# Patient Record
Sex: FEMALE | Race: WHITE | Employment: OTHER | URBAN - METROPOLITAN AREA
[De-identification: names, ages, dates, MRNs, and addresses within clinical notes are randomized per-mention and may not be internally consistent; named-entity substitution may affect disease eponyms.]

---

## 2017-01-17 ENCOUNTER — OFFICE VISIT (OUTPATIENT)
Dept: URGENT CARE | Facility: URGENT CARE | Age: 72
End: 2017-01-17
Payer: MEDICARE

## 2017-01-17 VITALS
OXYGEN SATURATION: 92 % | SYSTOLIC BLOOD PRESSURE: 124 MMHG | WEIGHT: 175 LBS | BODY MASS INDEX: 32 KG/M2 | DIASTOLIC BLOOD PRESSURE: 78 MMHG | HEART RATE: 101 BPM | TEMPERATURE: 97.4 F

## 2017-01-17 DIAGNOSIS — J06.9 UPPER RESPIRATORY TRACT INFECTION, UNSPECIFIED TYPE: ICD-10-CM

## 2017-01-17 DIAGNOSIS — J01.90 ACUTE SINUSITIS WITH SYMPTOMS > 10 DAYS: Primary | ICD-10-CM

## 2017-01-17 PROCEDURE — 99213 OFFICE O/P EST LOW 20 MIN: CPT | Performed by: PHYSICIAN ASSISTANT

## 2017-01-17 NOTE — NURSING NOTE
"Chief Complaint   Patient presents with     Urgent Care     x2.5 weeks headache and congestion        Initial /78 mmHg  Pulse 101  Temp(Src) 97.4  F (36.3  C) (Oral)  Wt 175 lb (79.379 kg)  SpO2 92% Estimated body mass index is 32 kg/(m^2) as calculated from the following:    Height as of 11/12/15: 5' 2\" (1.575 m).    Weight as of this encounter: 175 lb (79.379 kg).  BP completed using cuff size: sal Lazaro MA      "

## 2017-01-17 NOTE — PROGRESS NOTES
SUBJECTIVE:   Kaitlyn Yao is a 71 year old female presenting with a chief complaint of sinus congestion, chest congestion.  Onset of symptoms was 2 week(s) ago.  Course of illness is worsening.    Severity moderate  Current and Associated symptoms: sinus drainage, congestion  Treatment measures tried include OTC meds.  Predisposing factors include recent illness.    Past Medical History   Diagnosis Date     Hypertension      Hyperlipidemia      Diabetes mellitus (H)      type 2     Rosacea      Urinary, incontinence, stress female      Noninfectious ileitis      Meniere's disease      Hearing loss         Allergies   Allergen Reactions     Chocolate Other (See Comments)     migraines         Social History   Substance Use Topics     Smoking status: Former Smoker     Quit date: 08/17/1977     Smokeless tobacco: Never Used     Alcohol Use: No       ROS:  CONSTITUTIONAL:NEGATIVE for fever, chills, change in weight  INTEGUMENTARY/SKIN: NEGATIVE for worrisome rashes, moles or lesions  ENT/MOUTH: POSITIVE for sinus drainage, congestion  RESP:NEGATIVE for significant cough or SOB  CV: NEGATIVE for chest pain, palpitations or peripheral edema  MUSCULOSKELETAL: NEGATIVE for significant arthralgias or myalgia  NEURO: NEGATIVE for weakness, dizziness or paresthesias    OBJECTIVE  :/78 mmHg  Pulse 101  Temp(Src) 97.4  F (36.3  C) (Oral)  Wt 175 lb (79.379 kg)  SpO2 92%  GENERAL APPEARANCE: healthy, alert and no distress  HENT: TM's normal bilaterally, nasal turbinates erythematous, swollen and rhinorrhea purulent  NECK: supple, nontender, no lymphadenopathy  RESP: lungs clear to auscultation - no rales, rhonchi or wheezes  CV: regular rates and rhythm, normal S1 S2, no murmur noted  ABDOMEN:  soft, nontender, no HSM or masses and bowel sounds normal  NEURO: Normal strength and tone, sensory exam grossly normal,  normal speech and mentation  SKIN: no suspicious lesions or rashes    ASSESSMENT/PLAN      ICD-10-CM     1. Acute sinusitis with symptoms > 10 days J01.90 amoxicillin-clavulanate (AUGMENTIN) 875-125 MG per tablet   2. Upper respiratory tract infection, unspecified type J06.9      Follow up as needed

## 2017-11-30 ENCOUNTER — APPOINTMENT (OUTPATIENT)
Dept: CT IMAGING | Facility: CLINIC | Age: 72
DRG: 872 | End: 2017-11-30
Attending: EMERGENCY MEDICINE
Payer: MEDICARE

## 2017-11-30 ENCOUNTER — ANESTHESIA (OUTPATIENT)
Dept: SURGERY | Facility: CLINIC | Age: 72
DRG: 872 | End: 2017-11-30
Payer: MEDICARE

## 2017-11-30 ENCOUNTER — ANESTHESIA EVENT (OUTPATIENT)
Dept: SURGERY | Facility: CLINIC | Age: 72
DRG: 872 | End: 2017-11-30
Payer: MEDICARE

## 2017-11-30 ENCOUNTER — HOSPITAL ENCOUNTER (INPATIENT)
Facility: CLINIC | Age: 72
LOS: 2 days | Discharge: HOME | DRG: 872 | End: 2017-12-02
Attending: EMERGENCY MEDICINE | Admitting: INTERNAL MEDICINE
Payer: MEDICARE

## 2017-11-30 ENCOUNTER — APPOINTMENT (OUTPATIENT)
Dept: GENERAL RADIOLOGY | Facility: CLINIC | Age: 72
DRG: 872 | End: 2017-11-30
Attending: INTERNAL MEDICINE
Payer: MEDICARE

## 2017-11-30 DIAGNOSIS — N10 ACUTE PYELONEPHRITIS: ICD-10-CM

## 2017-11-30 DIAGNOSIS — B37.31 YEAST INFECTION OF THE VAGINA: ICD-10-CM

## 2017-11-30 DIAGNOSIS — N20.1 CALCULUS OF URETER: ICD-10-CM

## 2017-11-30 DIAGNOSIS — Z78.9 TAKES DIETARY SUPPLEMENTS: Primary | ICD-10-CM

## 2017-11-30 LAB
ALBUMIN UR-MCNC: 30 MG/DL
ANION GAP SERPL CALCULATED.3IONS-SCNC: 6 MMOL/L (ref 3–14)
APPEARANCE UR: ABNORMAL
BASOPHILS # BLD AUTO: 0 10E9/L (ref 0–0.2)
BASOPHILS NFR BLD AUTO: 0.2 %
BILIRUB UR QL STRIP: NEGATIVE
BUN SERPL-MCNC: 23 MG/DL (ref 7–30)
CALCIUM SERPL-MCNC: 9 MG/DL (ref 8.5–10.1)
CHLORIDE SERPL-SCNC: 105 MMOL/L (ref 94–109)
CO2 SERPL-SCNC: 27 MMOL/L (ref 20–32)
COLOR UR AUTO: YELLOW
CREAT SERPL-MCNC: 0.71 MG/DL (ref 0.52–1.04)
DIFFERENTIAL METHOD BLD: ABNORMAL
EOSINOPHIL # BLD AUTO: 0 10E9/L (ref 0–0.7)
EOSINOPHIL NFR BLD AUTO: 0.2 %
ERYTHROCYTE [DISTWIDTH] IN BLOOD BY AUTOMATED COUNT: 13.9 % (ref 10–15)
GFR SERPL CREATININE-BSD FRML MDRD: 81 ML/MIN/1.7M2
GLUCOSE BLDC GLUCOMTR-MCNC: 178 MG/DL (ref 70–99)
GLUCOSE BLDC GLUCOMTR-MCNC: 199 MG/DL (ref 70–99)
GLUCOSE BLDC GLUCOMTR-MCNC: 208 MG/DL (ref 70–99)
GLUCOSE BLDC GLUCOMTR-MCNC: 221 MG/DL (ref 70–99)
GLUCOSE BLDC GLUCOMTR-MCNC: 245 MG/DL (ref 70–99)
GLUCOSE SERPL-MCNC: 248 MG/DL (ref 70–99)
GLUCOSE UR STRIP-MCNC: 300 MG/DL
HCT VFR BLD AUTO: 42.3 % (ref 35–47)
HGB BLD-MCNC: 14.5 G/DL (ref 11.7–15.7)
HGB UR QL STRIP: ABNORMAL
IMM GRANULOCYTES # BLD: 0 10E9/L (ref 0–0.4)
IMM GRANULOCYTES NFR BLD: 0.2 %
KETONES UR STRIP-MCNC: 10 MG/DL
LEUKOCYTE ESTERASE UR QL STRIP: ABNORMAL
LYMPHOCYTES # BLD AUTO: 1.3 10E9/L (ref 0.8–5.3)
LYMPHOCYTES NFR BLD AUTO: 10.5 %
MCH RBC QN AUTO: 31.5 PG (ref 26.5–33)
MCHC RBC AUTO-ENTMCNC: 34.3 G/DL (ref 31.5–36.5)
MCV RBC AUTO: 92 FL (ref 78–100)
MONOCYTES # BLD AUTO: 0.3 10E9/L (ref 0–1.3)
MONOCYTES NFR BLD AUTO: 2.5 %
MUCOUS THREADS #/AREA URNS LPF: PRESENT /LPF
NEUTROPHILS # BLD AUTO: 11 10E9/L (ref 1.6–8.3)
NEUTROPHILS NFR BLD AUTO: 86.4 %
NITRATE UR QL: POSITIVE
NRBC # BLD AUTO: 0 10*3/UL
NRBC BLD AUTO-RTO: 0 /100
PH UR STRIP: 5.5 PH (ref 5–7)
PLATELET # BLD AUTO: 208 10E9/L (ref 150–450)
POTASSIUM SERPL-SCNC: 4.8 MMOL/L (ref 3.4–5.3)
RBC # BLD AUTO: 4.6 10E12/L (ref 3.8–5.2)
RBC #/AREA URNS AUTO: 26 /HPF (ref 0–2)
SODIUM SERPL-SCNC: 138 MMOL/L (ref 133–144)
SOURCE: ABNORMAL
SP GR UR STRIP: 1.02 (ref 1–1.03)
SQUAMOUS #/AREA URNS AUTO: 1 /HPF (ref 0–1)
UROBILINOGEN UR STRIP-MCNC: NORMAL MG/DL (ref 0–2)
WBC # BLD AUTO: 12.8 10E9/L (ref 4–11)
WBC #/AREA URNS AUTO: >182 /HPF (ref 0–2)
WBC CLUMPS #/AREA URNS HPF: PRESENT /HPF

## 2017-11-30 PROCEDURE — 27210794 ZZH OR GENERAL SUPPLY STERILE: Performed by: UROLOGY

## 2017-11-30 PROCEDURE — C1758 CATHETER, URETERAL: HCPCS | Performed by: UROLOGY

## 2017-11-30 PROCEDURE — 99285 EMERGENCY DEPT VISIT HI MDM: CPT | Mod: 25

## 2017-11-30 PROCEDURE — 00000146 ZZHCL STATISTIC GLUCOSE BY METER IP

## 2017-11-30 PROCEDURE — 87186 SC STD MICRODIL/AGAR DIL: CPT | Performed by: EMERGENCY MEDICINE

## 2017-11-30 PROCEDURE — 87086 URINE CULTURE/COLONY COUNT: CPT | Performed by: EMERGENCY MEDICINE

## 2017-11-30 PROCEDURE — 96375 TX/PRO/DX INJ NEW DRUG ADDON: CPT

## 2017-11-30 PROCEDURE — 25000128 H RX IP 250 OP 636: Performed by: ANESTHESIOLOGY

## 2017-11-30 PROCEDURE — 25000128 H RX IP 250 OP 636: Performed by: EMERGENCY MEDICINE

## 2017-11-30 PROCEDURE — 25000128 H RX IP 250 OP 636: Performed by: NURSE ANESTHETIST, CERTIFIED REGISTERED

## 2017-11-30 PROCEDURE — 96365 THER/PROPH/DIAG IV INF INIT: CPT

## 2017-11-30 PROCEDURE — 12000000 ZZH R&B MED SURG/OB

## 2017-11-30 PROCEDURE — 71000012 ZZH RECOVERY PHASE 1 LEVEL 1 FIRST HR: Performed by: UROLOGY

## 2017-11-30 PROCEDURE — 74176 CT ABD & PELVIS W/O CONTRAST: CPT

## 2017-11-30 PROCEDURE — 80048 BASIC METABOLIC PNL TOTAL CA: CPT | Performed by: EMERGENCY MEDICINE

## 2017-11-30 PROCEDURE — 36000050 ZZH SURGERY LEVEL 2 1ST 30 MIN: Performed by: UROLOGY

## 2017-11-30 PROCEDURE — 37000009 ZZH ANESTHESIA TECHNICAL FEE, EACH ADDTL 15 MIN: Performed by: UROLOGY

## 2017-11-30 PROCEDURE — 93005 ELECTROCARDIOGRAM TRACING: CPT

## 2017-11-30 PROCEDURE — 37000008 ZZH ANESTHESIA TECHNICAL FEE, 1ST 30 MIN: Performed by: UROLOGY

## 2017-11-30 PROCEDURE — 27210995 ZZH RX 272: Performed by: UROLOGY

## 2017-11-30 PROCEDURE — 96361 HYDRATE IV INFUSION ADD-ON: CPT

## 2017-11-30 PROCEDURE — 25800025 ZZH RX 258: Performed by: UROLOGY

## 2017-11-30 PROCEDURE — 25000566 ZZH SEVOFLURANE, EA 15 MIN: Performed by: UROLOGY

## 2017-11-30 PROCEDURE — 40000170 ZZH STATISTIC PRE-PROCEDURE ASSESSMENT II: Performed by: UROLOGY

## 2017-11-30 PROCEDURE — 99207 ZZC CDG-MDM COMPONENT: MEETS LOW - DOWN CODED: CPT | Performed by: INTERNAL MEDICINE

## 2017-11-30 PROCEDURE — 25000132 ZZH RX MED GY IP 250 OP 250 PS 637: Mod: GY | Performed by: INTERNAL MEDICINE

## 2017-11-30 PROCEDURE — 0T9780Z DRAINAGE OF LEFT URETER WITH DRAINAGE DEVICE, VIA NATURAL OR ARTIFICIAL OPENING ENDOSCOPIC: ICD-10-PCS | Performed by: UROLOGY

## 2017-11-30 PROCEDURE — 85025 COMPLETE CBC W/AUTO DIFF WBC: CPT | Performed by: EMERGENCY MEDICINE

## 2017-11-30 PROCEDURE — 81001 URINALYSIS AUTO W/SCOPE: CPT | Performed by: EMERGENCY MEDICINE

## 2017-11-30 PROCEDURE — 25000131 ZZH RX MED GY IP 250 OP 636 PS 637: Mod: GY | Performed by: INTERNAL MEDICINE

## 2017-11-30 PROCEDURE — 40000277 XR SURGERY CARM FLUORO LESS THAN 5 MIN W STILLS

## 2017-11-30 PROCEDURE — A9270 NON-COVERED ITEM OR SERVICE: HCPCS | Mod: GY | Performed by: INTERNAL MEDICINE

## 2017-11-30 PROCEDURE — 25000125 ZZHC RX 250: Performed by: NURSE ANESTHETIST, CERTIFIED REGISTERED

## 2017-11-30 PROCEDURE — 99222 1ST HOSP IP/OBS MODERATE 55: CPT | Mod: AI | Performed by: INTERNAL MEDICINE

## 2017-11-30 PROCEDURE — C1769 GUIDE WIRE: HCPCS | Performed by: UROLOGY

## 2017-11-30 PROCEDURE — 25000128 H RX IP 250 OP 636: Performed by: INTERNAL MEDICINE

## 2017-11-30 PROCEDURE — 87088 URINE BACTERIA CULTURE: CPT | Performed by: EMERGENCY MEDICINE

## 2017-11-30 PROCEDURE — 25000128 H RX IP 250 OP 636: Performed by: UROLOGY

## 2017-11-30 PROCEDURE — 36000052 ZZH SURGERY LEVEL 2 EA 15 ADDTL MIN: Performed by: UROLOGY

## 2017-11-30 PROCEDURE — C2617 STENT, NON-COR, TEM W/O DEL: HCPCS | Performed by: UROLOGY

## 2017-11-30 PROCEDURE — 93010 ELECTROCARDIOGRAM REPORT: CPT | Performed by: INTERNAL MEDICINE

## 2017-11-30 DEVICE — STENT URETERAL DBL PIGTAIL INLAY 6FRX24CM 778624
Type: IMPLANTABLE DEVICE | Site: URETER | Status: NON-FUNCTIONAL
Removed: 2017-12-22

## 2017-11-30 RX ORDER — ANTIARTHRITIC COMBINATION NO.2 900 MG
5000 TABLET ORAL DAILY
COMMUNITY
End: 2017-12-19

## 2017-11-30 RX ORDER — ACETAMINOPHEN 650 MG/1
650 SUPPOSITORY RECTAL EVERY 4 HOURS PRN
Status: DISCONTINUED | OUTPATIENT
Start: 2017-11-30 | End: 2017-12-02 | Stop reason: HOSPADM

## 2017-11-30 RX ORDER — ONDANSETRON 4 MG/1
4 TABLET, ORALLY DISINTEGRATING ORAL EVERY 6 HOURS PRN
Status: DISCONTINUED | OUTPATIENT
Start: 2017-11-30 | End: 2017-12-02 | Stop reason: HOSPADM

## 2017-11-30 RX ORDER — FENTANYL CITRATE 50 UG/ML
INJECTION, SOLUTION INTRAMUSCULAR; INTRAVENOUS PRN
Status: DISCONTINUED | OUTPATIENT
Start: 2017-11-30 | End: 2017-11-30

## 2017-11-30 RX ORDER — PROCHLORPERAZINE MALEATE 5 MG
5 TABLET ORAL EVERY 6 HOURS PRN
Status: DISCONTINUED | OUTPATIENT
Start: 2017-11-30 | End: 2017-12-02 | Stop reason: HOSPADM

## 2017-11-30 RX ORDER — DEXTROSE MONOHYDRATE 25 G/50ML
25-50 INJECTION, SOLUTION INTRAVENOUS
Status: DISCONTINUED | OUTPATIENT
Start: 2017-11-30 | End: 2017-12-02 | Stop reason: HOSPADM

## 2017-11-30 RX ORDER — PROCHLORPERAZINE 25 MG
12.5 SUPPOSITORY, RECTAL RECTAL EVERY 12 HOURS PRN
Status: DISCONTINUED | OUTPATIENT
Start: 2017-11-30 | End: 2017-12-02 | Stop reason: HOSPADM

## 2017-11-30 RX ORDER — FENTANYL CITRATE 50 UG/ML
25-50 INJECTION, SOLUTION INTRAMUSCULAR; INTRAVENOUS
Status: DISCONTINUED | OUTPATIENT
Start: 2017-11-30 | End: 2017-11-30 | Stop reason: HOSPADM

## 2017-11-30 RX ORDER — LACTOBACILLUS RHAMNOSUS GG 10B CELL
1 CAPSULE ORAL DAILY
Status: DISCONTINUED | OUTPATIENT
Start: 2017-11-30 | End: 2017-12-02 | Stop reason: HOSPADM

## 2017-11-30 RX ORDER — PROPOFOL 10 MG/ML
INJECTION, EMULSION INTRAVENOUS PRN
Status: DISCONTINUED | OUTPATIENT
Start: 2017-11-30 | End: 2017-11-30

## 2017-11-30 RX ORDER — NICOTINE POLACRILEX 4 MG
15-30 LOZENGE BUCCAL
Status: DISCONTINUED | OUTPATIENT
Start: 2017-11-30 | End: 2017-12-02 | Stop reason: HOSPADM

## 2017-11-30 RX ORDER — FENTANYL CITRATE 50 UG/ML
25-50 INJECTION, SOLUTION INTRAMUSCULAR; INTRAVENOUS
Status: DISCONTINUED | OUTPATIENT
Start: 2017-11-30 | End: 2017-11-30

## 2017-11-30 RX ORDER — ONDANSETRON 2 MG/ML
4 INJECTION INTRAMUSCULAR; INTRAVENOUS EVERY 30 MIN PRN
Status: DISCONTINUED | OUTPATIENT
Start: 2017-11-30 | End: 2017-11-30 | Stop reason: HOSPADM

## 2017-11-30 RX ORDER — ONDANSETRON 4 MG/1
4 TABLET, ORALLY DISINTEGRATING ORAL EVERY 30 MIN PRN
Status: DISCONTINUED | OUTPATIENT
Start: 2017-11-30 | End: 2017-11-30 | Stop reason: HOSPADM

## 2017-11-30 RX ORDER — KETOROLAC TROMETHAMINE 15 MG/ML
15 INJECTION, SOLUTION INTRAMUSCULAR; INTRAVENOUS ONCE
Status: COMPLETED | OUTPATIENT
Start: 2017-11-30 | End: 2017-11-30

## 2017-11-30 RX ORDER — ONDANSETRON 2 MG/ML
4 INJECTION INTRAMUSCULAR; INTRAVENOUS EVERY 6 HOURS PRN
Status: DISCONTINUED | OUTPATIENT
Start: 2017-11-30 | End: 2017-12-02 | Stop reason: HOSPADM

## 2017-11-30 RX ORDER — NALOXONE HYDROCHLORIDE 0.4 MG/ML
.1-.4 INJECTION, SOLUTION INTRAMUSCULAR; INTRAVENOUS; SUBCUTANEOUS
Status: DISCONTINUED | OUTPATIENT
Start: 2017-11-30 | End: 2017-12-02 | Stop reason: HOSPADM

## 2017-11-30 RX ORDER — CEFAZOLIN SODIUM 2 G/100ML
2 INJECTION, SOLUTION INTRAVENOUS
Status: DISCONTINUED | OUTPATIENT
Start: 2017-11-30 | End: 2017-11-30

## 2017-11-30 RX ORDER — POLYETHYLENE GLYCOL 3350 17 G/17G
17 POWDER, FOR SOLUTION ORAL DAILY PRN
Status: DISCONTINUED | OUTPATIENT
Start: 2017-11-30 | End: 2017-12-02 | Stop reason: HOSPADM

## 2017-11-30 RX ORDER — SODIUM CHLORIDE, SODIUM LACTATE, POTASSIUM CHLORIDE, CALCIUM CHLORIDE 600; 310; 30; 20 MG/100ML; MG/100ML; MG/100ML; MG/100ML
INJECTION, SOLUTION INTRAVENOUS CONTINUOUS
Status: DISCONTINUED | OUTPATIENT
Start: 2017-11-30 | End: 2017-11-30

## 2017-11-30 RX ORDER — MORPHINE SULFATE 2 MG/ML
2-4 INJECTION, SOLUTION INTRAMUSCULAR; INTRAVENOUS
Status: DISCONTINUED | OUTPATIENT
Start: 2017-11-30 | End: 2017-12-02 | Stop reason: HOSPADM

## 2017-11-30 RX ORDER — SODIUM CHLORIDE, SODIUM LACTATE, POTASSIUM CHLORIDE, CALCIUM CHLORIDE 600; 310; 30; 20 MG/100ML; MG/100ML; MG/100ML; MG/100ML
INJECTION, SOLUTION INTRAVENOUS CONTINUOUS PRN
Status: DISCONTINUED | OUTPATIENT
Start: 2017-11-30 | End: 2017-11-30

## 2017-11-30 RX ORDER — SODIUM CHLORIDE 9 MG/ML
INJECTION, SOLUTION INTRAVENOUS CONTINUOUS
Status: DISCONTINUED | OUTPATIENT
Start: 2017-11-30 | End: 2017-12-01

## 2017-11-30 RX ORDER — OXYCODONE AND ACETAMINOPHEN 5; 325 MG/1; MG/1
1-2 TABLET ORAL EVERY 6 HOURS PRN
Status: DISCONTINUED | OUTPATIENT
Start: 2017-11-30 | End: 2017-12-02 | Stop reason: HOSPADM

## 2017-11-30 RX ORDER — MEPERIDINE HYDROCHLORIDE 25 MG/ML
12.5 INJECTION INTRAMUSCULAR; INTRAVENOUS; SUBCUTANEOUS EVERY 5 MIN PRN
Status: DISCONTINUED | OUTPATIENT
Start: 2017-11-30 | End: 2017-11-30 | Stop reason: HOSPADM

## 2017-11-30 RX ORDER — ONDANSETRON 2 MG/ML
INJECTION INTRAMUSCULAR; INTRAVENOUS PRN
Status: DISCONTINUED | OUTPATIENT
Start: 2017-11-30 | End: 2017-11-30

## 2017-11-30 RX ORDER — MORPHINE SULFATE 4 MG/ML
4 INJECTION, SOLUTION INTRAMUSCULAR; INTRAVENOUS ONCE
Status: COMPLETED | OUTPATIENT
Start: 2017-11-30 | End: 2017-11-30

## 2017-11-30 RX ORDER — TAMSULOSIN HYDROCHLORIDE 0.4 MG/1
0.4 CAPSULE ORAL DAILY
Status: DISCONTINUED | OUTPATIENT
Start: 2017-11-30 | End: 2017-12-02 | Stop reason: HOSPADM

## 2017-11-30 RX ORDER — ACETAMINOPHEN 325 MG/1
650 TABLET ORAL EVERY 4 HOURS PRN
Status: DISCONTINUED | OUTPATIENT
Start: 2017-11-30 | End: 2017-12-02 | Stop reason: HOSPADM

## 2017-11-30 RX ORDER — LIDOCAINE HYDROCHLORIDE 20 MG/ML
INJECTION, SOLUTION INFILTRATION; PERINEURAL PRN
Status: DISCONTINUED | OUTPATIENT
Start: 2017-11-30 | End: 2017-11-30

## 2017-11-30 RX ORDER — PRAVASTATIN SODIUM 20 MG
20 TABLET ORAL DAILY
Status: DISCONTINUED | OUTPATIENT
Start: 2017-11-30 | End: 2017-12-02 | Stop reason: HOSPADM

## 2017-11-30 RX ORDER — HYDROMORPHONE HYDROCHLORIDE 1 MG/ML
.3-.5 INJECTION, SOLUTION INTRAMUSCULAR; INTRAVENOUS; SUBCUTANEOUS EVERY 5 MIN PRN
Status: DISCONTINUED | OUTPATIENT
Start: 2017-11-30 | End: 2017-11-30 | Stop reason: HOSPADM

## 2017-11-30 RX ORDER — DEXAMETHASONE SODIUM PHOSPHATE 4 MG/ML
INJECTION, SOLUTION INTRA-ARTICULAR; INTRALESIONAL; INTRAMUSCULAR; INTRAVENOUS; SOFT TISSUE PRN
Status: DISCONTINUED | OUTPATIENT
Start: 2017-11-30 | End: 2017-11-30

## 2017-11-30 RX ORDER — PROPOFOL 10 MG/ML
INJECTION, EMULSION INTRAVENOUS CONTINUOUS PRN
Status: DISCONTINUED | OUTPATIENT
Start: 2017-11-30 | End: 2017-11-30

## 2017-11-30 RX ORDER — SODIUM CHLORIDE, SODIUM LACTATE, POTASSIUM CHLORIDE, CALCIUM CHLORIDE 600; 310; 30; 20 MG/100ML; MG/100ML; MG/100ML; MG/100ML
INJECTION, SOLUTION INTRAVENOUS CONTINUOUS
Status: DISCONTINUED | OUTPATIENT
Start: 2017-11-30 | End: 2017-12-01

## 2017-11-30 RX ORDER — ONDANSETRON 2 MG/ML
4 INJECTION INTRAMUSCULAR; INTRAVENOUS
Status: DISCONTINUED | OUTPATIENT
Start: 2017-11-30 | End: 2017-11-30

## 2017-11-30 RX ADMIN — MIDAZOLAM HYDROCHLORIDE 2 MG: 1 INJECTION, SOLUTION INTRAMUSCULAR; INTRAVENOUS at 14:53

## 2017-11-30 RX ADMIN — SODIUM CHLORIDE: 9 INJECTION, SOLUTION INTRAVENOUS at 09:17

## 2017-11-30 RX ADMIN — SODIUM CHLORIDE, POTASSIUM CHLORIDE, SODIUM LACTATE AND CALCIUM CHLORIDE: 600; 310; 30; 20 INJECTION, SOLUTION INTRAVENOUS at 14:34

## 2017-11-30 RX ADMIN — LIDOCAINE HYDROCHLORIDE 40 MG: 20 INJECTION, SOLUTION INFILTRATION; PERINEURAL at 14:56

## 2017-11-30 RX ADMIN — ONDANSETRON 4 MG: 2 INJECTION INTRAMUSCULAR; INTRAVENOUS at 06:22

## 2017-11-30 RX ADMIN — CEFTRIAXONE 1 G: 1 INJECTION, SOLUTION INTRAVENOUS at 06:42

## 2017-11-30 RX ADMIN — FENTANYL CITRATE 50 MCG: 50 INJECTION, SOLUTION INTRAMUSCULAR; INTRAVENOUS at 14:56

## 2017-11-30 RX ADMIN — PROPOFOL 50 MCG/KG/MIN: 10 INJECTION, EMULSION INTRAVENOUS at 14:56

## 2017-11-30 RX ADMIN — DEXAMETHASONE SODIUM PHOSPHATE 4 MG: 4 INJECTION, SOLUTION INTRA-ARTICULAR; INTRALESIONAL; INTRAMUSCULAR; INTRAVENOUS; SOFT TISSUE at 15:12

## 2017-11-30 RX ADMIN — PROPOFOL 200 MG: 10 INJECTION, EMULSION INTRAVENOUS at 14:56

## 2017-11-30 RX ADMIN — Medication 1 CAPSULE: at 09:18

## 2017-11-30 RX ADMIN — KETOROLAC TROMETHAMINE 15 MG: 15 INJECTION, SOLUTION INTRAMUSCULAR; INTRAVENOUS at 06:17

## 2017-11-30 RX ADMIN — ONDANSETRON 4 MG: 2 INJECTION INTRAMUSCULAR; INTRAVENOUS at 15:16

## 2017-11-30 RX ADMIN — FENTANYL CITRATE 50 MCG: 50 INJECTION, SOLUTION INTRAMUSCULAR; INTRAVENOUS at 15:07

## 2017-11-30 RX ADMIN — TAMSULOSIN HYDROCHLORIDE 0.4 MG: 0.4 CAPSULE ORAL at 09:18

## 2017-11-30 RX ADMIN — SODIUM CHLORIDE, POTASSIUM CHLORIDE, SODIUM LACTATE AND CALCIUM CHLORIDE: 600; 310; 30; 20 INJECTION, SOLUTION INTRAVENOUS at 14:51

## 2017-11-30 RX ADMIN — MORPHINE SULFATE 4 MG: 4 INJECTION, SOLUTION INTRAMUSCULAR; INTRAVENOUS at 06:33

## 2017-11-30 RX ADMIN — INSULIN ASPART 1 UNITS: 100 INJECTION, SOLUTION INTRAVENOUS; SUBCUTANEOUS at 17:47

## 2017-11-30 RX ADMIN — SODIUM CHLORIDE 1000 ML: 9 INJECTION, SOLUTION INTRAVENOUS at 06:19

## 2017-11-30 RX ADMIN — ACETAMINOPHEN 650 MG: 325 TABLET, FILM COATED ORAL at 19:25

## 2017-11-30 ASSESSMENT — ENCOUNTER SYMPTOMS
FLANK PAIN: 1
FEVER: 1
CHILLS: 1
NAUSEA: 1
ABDOMINAL PAIN: 1
VOMITING: 0
DYSURIA: 0
HEMATURIA: 0
DIFFICULTY URINATING: 0

## 2017-11-30 ASSESSMENT — ACTIVITIES OF DAILY LIVING (ADL)
BATHING: 0-->INDEPENDENT
RETIRED_COMMUNICATION: 0-->UNDERSTANDS/COMMUNICATES WITHOUT DIFFICULTY
TOILETING: 0-->INDEPENDENT
FALL_HISTORY_WITHIN_LAST_SIX_MONTHS: NO
AMBULATION: 0-->INDEPENDENT
TRANSFERRING: 0-->INDEPENDENT
RETIRED_EATING: 0-->INDEPENDENT
DRESS: 0-->INDEPENDENT
SWALLOWING: 0-->SWALLOWS FOODS/LIQUIDS WITHOUT DIFFICULTY
COGNITION: 0 - NO COGNITION ISSUES REPORTED

## 2017-11-30 NOTE — ANESTHESIA PREPROCEDURE EVALUATION
Anesthesia Evaluation     . Pt has had prior anesthetic.     No history of anesthetic complications          ROS/MED HX    ENT/Pulmonary:      (-) sleep apnea   Neurologic:       Cardiovascular: Comment: HTN here - no meds    (+) hypertension----. : . . . :. .       METS/Exercise Tolerance:  >4 METS   Hematologic:         Musculoskeletal:         GI/Hepatic:        (-) GERD   Renal/Genitourinary:         Endo:      (-) Type I DM   Psychiatric:         Infectious Disease:         Malignancy:         Other:                     Physical Exam  Normal systems: pulmonary and dental    Airway   Mallampati: III  TM distance: >3 FB  Neck ROM: full    Dental     Cardiovascular   Rhythm and rate: regular and normal      Pulmonary                     Anesthesia Plan      History & Physical Review  History and physical reviewed and following examination; no interval change.    ASA Status:  2 .    NPO Status:  > 8 hours    Plan for General, LMA and Periph. Nerve Block for postop pain   PONV prophylaxis:  Ondansetron (or other 5HT-3) and Dexamethasone or Solumedrol  toradol if ok with surgeon      Postoperative Care      Consents  Anesthetic plan, risks, benefits and alternatives discussed with:  Patient..                          .

## 2017-11-30 NOTE — PROGRESS NOTES
RECEIVING UNIT ED HANDOFF REVIEW    ED Nurse Handoff Report was reviewed by: Vickie Engle on November 30, 2017 at 8:34 AM

## 2017-11-30 NOTE — PHARMACY-ADMISSION MEDICATION HISTORY
Admission medication history interview status for the 11/30/2017  admission is complete. See EPIC admission navigator for prior to admission medications     Medication history source reliability:Good    Actions taken by pharmacist (provider contacted, etc): interviewed patient, she had list with her     Additional medication history information not noted on PTA med list :None    Medication reconciliation/reorder completed by provider prior to medication history? No    Time spent in this activity: 15 min    Prior to Admission medications    Medication Sig Last Dose Taking? Auth Provider   ASPIRIN PO Take 81 mg by mouth daily 11/29/2017 at Unknown time Yes Unknown, Entered By History   Cholecalciferol (VITAMIN D-3 PO) Take 1,000 Units by mouth daily 11/29/2017 at Unknown time Yes Unknown, Entered By History   Zinc 50 MG CAPS Take 50 mg by mouth daily 11/29/2017 at Unknown time Yes Unknown, Entered By History   MAGNESIUM OXIDE PO Take 200 mg by mouth daily 11/29/2017 at Unknown time Yes Unknown, Entered By History   Lactobacillus (PROBIOTIC ACIDOPHILUS PO) Take 1 capsule by mouth daily 11/29/2017 at Unknown time Yes Unknown, Entered By History   Biotin 5000 MCG TABS Take 5,000 Units by mouth daily 11/29/2017 at Unknown time Yes Unknown, Entered By History   Coenzyme Q10 (COQ-10 PO) Take 200 Units by mouth daily 11/29/2017 at Unknown time Yes Unknown, Entered By History   exenatide (BYDUREON) 2 MG SUSR Inject 2 mg Subcutaneous once a week 11/29/2017 at Unknown time Yes Reported, Patient   irbesartan (AVAPRO) 300 MG tablet Take 300 mg by mouth daily  11/30/2017 at am Yes Reported, Patient   metroNIDAZOLE (METROLOTION) 0.75 % LOTN Externally apply topically 2 times daily as needed  prn Yes Reported, Patient   Pravastatin Sodium (PRAVACHOL PO) Take 20 mg by mouth daily. 11/29/2017 at Unknown time Yes Reported, Patient

## 2017-11-30 NOTE — ANESTHESIA POSTPROCEDURE EVALUATION
Patient: Kaitlyn Yao    Procedure(s):  CYSTOSCOPY, LEFT URETERAL STENT PLACEMENT - Wound Class: II-Clean Contaminated    Diagnosis:CYSTO, LEFT STENT PLACEMENT  SEPTIC  Diagnosis Additional Information: No value filed.    Anesthesia Type:  General, LMA, Periph. Nerve Block for postop pain    Note:  Anesthesia Post Evaluation    Patient location during evaluation: PACU  Patient participation: Able to fully participate in evaluation  Level of consciousness: awake and alert  Pain management: adequate  Airway patency: patent  Cardiovascular status: acceptable  Respiratory status: acceptable  Hydration status: acceptable  PONV: none and controlled     Anesthetic complications: None          Last vitals:  Vitals:    11/30/17 1615 11/30/17 1630 11/30/17 1659   BP: 128/62 135/68 138/61   Pulse:      Resp: 26 (!) 35 22   Temp: 38.2  C (100.8  F) 38.2  C (100.8  F) 37.3  C (99.2  F)   SpO2: 92%           Electronically Signed By: Babatunde Rodriguez MD  November 30, 2017  5:04 PM

## 2017-11-30 NOTE — ED PROVIDER NOTES
"  History     Chief Complaint:  Flank pain    HPI   Kaitlyn Yao is a 72 year old female with a history of kidney stones who presents to the emergency department today for evaluation of flank pain. The patient reports yesterday she developed \"deep achy\" left flank pain with associated nausea, chills and \"slight\" fever of 100. The pain radiates to her left lower abdomen. Due to persistent symptoms this morning, she presents to the ED for further evaluation. Upon presentation, she rates her pain 8/10 and is afebrile at 97.9. She denies urinary symptoms, leg swelling, and vomiting. Of note, last kidney stone in the mid 1980's.     Allergies:  Chocolate     Medications:    aspirin (ECOTRIN) 81 mg enteric coated tablet   exenatide microspheres (BYDUREON) 2 mg serr   pravastatin (PRAVACHOL) 20 mg tablet   irbesartan (AVAPRO) 300 mg tablet   oxybutynin (DITROPAN) 5 MG tablet    Past Medical History:    Sciatica right side  type 2 diabetes mellitus  hyperlipidemia  meniere s disease  hypertension  IBS  Pyelonpehriits  Rosacea  Urge incontinence    Past Surgical History:    Cholecystectomy  Cystoscopy  DaVinci hysterectomy supracervical sacrocolpopexy, combined  Enhance endolymphatic sac, dec sigmoid sinus, extnd facial recess approach, mastoidectomy, BMT, combo  Tonsillectomy & Adenoidectomy   Open pyelolithotomy for kidney stone removal  Achilles tendon repair  Perineorrhaphy   Sling transvaginal      Family History:    Prostate cancer  Diabetes    Social History:  The patient was accompanied to the ED by her .  Smoking Status: Former - Quit in 1977  Smokeless Tobacco: Never  Alcohol Use: No  Marital Status:        Review of Systems   Constitutional: Positive for chills and fever.   Cardiovascular: Negative for leg swelling.   Gastrointestinal: Positive for abdominal pain (left lateral) and nausea. Negative for vomiting.   Genitourinary: Positive for flank pain (left). Negative for decreased urine volume, " "difficulty urinating, dysuria, hematuria and urgency.   All other systems reviewed and are negative.    Physical Exam   First Vitals:  BP: 152/77  Heart Rate: 83  Temp: 97.9  F (36.6  C)  Resp: 16  Height: 154.9 cm (5' 1\")  Weight: 76.7 kg (169 lb)  SpO2: 98 %    Physical Exam  Nursing note and vitals reviewed.  Constitutional:  Appears well-developed and well-nourished, appears uncomfortable.   HENT:   Mouth/Throat:  Mucosa is dry.  Eyes:    Conjunctivae are normal.      Pupils are equal, round, and reactive to light.      Right eye exhibits no discharge. Left eye exhibits no discharge.      No scleral icterus.   Cardiovascular:  Normal rate, regular rhythm.      Normal heart sounds and intact distal pulses.       No murmur heard.  Pulmonary/Chest:  Effort normal and breath sounds normal. No respiratory distress.     No wheezes. No rales. No chest wall tenderness. No stridor.   Abdominal:   Soft. No distension and no mass.      Left flank pain with some left lateral abdominal tenderness.     Mild guarding but no rebound.   Musculoskeletal:  Normal range of motion.      No edema and no tenderness.   Neurological:   Alert.      No cranial nerve deficit.      Exhibits normal muscle tone.      GCS eye subscore is 4. GCS verbal subscore is 5.      GCS motor subscore is 6.   Skin:    Skin is warm and dry. No rash noted. No diaphoresis.      No erythema. No pallor.   Psychiatric:   Behavior is normal. Judgment and thought content normal.     Emergency Department Course     Imaging:  Radiology findings were communicated with the patient and family who voiced understanding of the findings.  Abdominal/Pelvis CT no Contrast - Stone Protocol   IMPRESSION:  1. Obstructing mid left ureteral stone measuring 0.3 cm. This causes  mild to moderate left hydronephrosis. No other urinary tract calculi  are evident bilaterally.  2. Hyperdense lesion lower pole left kidney measures less than 1 cm  and is unchanged. This is most likely a " hyperdense or proteinaceous  cyst. Additional simple cyst left kidney is unchanged.  3. Scarring and/or tethering lateral aspect right kidney is stable and  likely postoperative in nature dating back to a prior CT from  9/7/2004.  4. Evidence of old granulomatous disease.  Report per radiology     Laboratory:  Laboratory findings were communicated with the patient and family who voiced understanding of the findings.  UA: cloudy yellow urine, , urineketon 10, moderate blood, protein albumin 30, nitrite positive, leukocyte esterase large, WBC >182 (H), RBC 26 (H), WBC clumps present, mucous present o/w WNL  BMP: Glucose 248 (H) o/w WNL (Creatinine 0.71)  CBC: WBC 12.8 (H) o/w WNL. (HGB 14.5, )     Urine Culture Aerobic Bacterial: Pending    Interventions:  0617 Toradol 15mg IV  0619 NS Bolus 1,000mL IV  0622 Zofran 4mg IV  0633 Morphine 4mg IV  0642 Rocephin 1g IV     Emergency Department Course:  Nursing notes and vitals reviewed.  The patient was sent for a Abdominal/Pelvis CT no Contrast - Stone Protocol while in the emergency department, results above.   IV was inserted and blood was drawn for laboratory testing, results above.  The patient provided a urine sample here in the emergency department. This was sent for laboratory testing, findings above.  0620: I performed an exam of the patient as documented above.   0708: Per RN, patient's pain is now 2/10.   0720: Patient rechecked and updated.   0732: I spoke with Dr. Moeller of the urology service regarding patient's presentation, findings, and plan of care.  0750: I spoke with Dr. Mckeon of the hospitalist service regarding patient's presentation, findings, and plan of care.  Findings and plan explained to the Patient and spouse who consents to admission. Discussed the patient with Dr. Mckeon, who will admit the patient to a [facility] bed for further monitoring, evaluation, and treatment.  I personally reviewed the laboratory and imaging results with the  Patient and spouse and answered all related questions prior to admission.    Impression & Plan      Medical Decision Making:  Patient comes in with left flank pain radiating around towards the front. History of kidney stones. Urine shows blood as well as bacteria and white cells and positive nitrite. Culture is ordered. She has had chills over the last 24 hours so I suspect that she does have an early kidney infection. CT scan was obtained and shows obstructing mid ureteral 3 mm stone on the left. I talked to Dr. Moeller and he agreed with antibiotics and admission. Dr. Mckeon will be admitting the patient. Her pain did get better with Toradol and morphine and Zofran was used for nausea. Her renal function is normal and electrolytes are normal.    Diagnosis:    ICD-10-CM    1. Acute pyelonephritis N10    2. Calculus of ureter N20.1     3 mm left mid obstructing     Disposition:  Admitted to inpatient [facility] bed.  Urology consultation today, continue antibodies, awaiting urine culture. Pain management.    Scribe Disclosure:  NKECHI, Donya El, am serving as a scribe at 6:01 AM on 11/30/2017 to document services personally performed by Ericka Freitas MD based on my observations and the provider's statements to me.     11/30/2017    EMERGENCY DEPARTMENT       Ericka Freitas MD  11/30/17 0812

## 2017-11-30 NOTE — PLAN OF CARE
Problem: Patient Care Overview  Goal: Plan of Care/Patient Progress Review  Outcome: No Change  Pt arrived from ED around 0900. A&O, calls appropriately. Up SBA/ind. Denies pain (received morphine in ED and has helped ever since). Has been NPO since this AM. Plan for cysto with stent placement this afternoon. Continue to monitor.

## 2017-11-30 NOTE — IP AVS SNAPSHOT
[address], [address]    [facility] [address]    Phone:  [phone]                                       After Visit Summary   11/30/2017    Kaitlyn Yao    MRN: [id number]           After Visit Summary Signature Page     I have received my discharge instructions, and my questions have been answered. I have discussed any challenges I see with this plan with the nurse or doctor.    ..........................................................................................................................................  Patient/Patient Representative Signature      ..........................................................................................................................................  Patient Representative Print Name and Relationship to Patient    ..................................................               ................................................  Date                                            Time    ..........................................................................................................................................  Reviewed by Signature/Title    ...................................................              ..............................................  Date                                                            Time

## 2017-11-30 NOTE — CONSULTS
Urology Consultation    Kaitlyn Yao MRN# [id number]   Age: 72 year old YOB: 1945     Date of Admission:  11/30/2017    Reason for consult: Fever, uti, obstructing stone       Requesting physician: Dr Mckeon       Level of consult: Consult, follow and place orders           Assessment and Plan:   Assessment:   Patient Active Problem List   Diagnosis     Uterovaginal prolapse, incomplete     Left ureteral stone  Probable uti  diabetes           Plan:   Keep NPO  Will arrange semi-emergent cystoscopy and left ureteral stent today  Procedure, plan, possible risk, complications discussed.  Patient understands definitive treatment of stone will be pursued after 10-14 days of antibiotics.  She will follow up with Dr Moeller               Chief Complaint:   Left back pain,fever,chills     History is obtained from the patient and electronic health record    73 YO WF ADMITTED THRU ED WITH ACUTE LEFT FLANK PAIN, FEVER, CHILLS AND CT  SCAN SHOWING A 3-4 MM MID LEFT URETERAL CALCULUS AND OBSTRUCTION.          Past Medical History:     Past Medical History:   Diagnosis Date     Diabetes mellitus (H)     type 2     Hearing loss      Hyperlipidemia      Hypertension      Meniere's disease      Noninfectious ileitis      Rosacea      Urinary, incontinence, stress female              Past Surgical History:   This patient  has a past surgical history that includes Abdomen surgery; Cholecystectomy; ENT surgery; orthopedic surgery; DaVINCI hysterectomy supracervical, sacrocolpopexy, combined (1/11/2013); Perineorrhaphy (1/11/2013); Sling transvaginal (1/11/2013); Cystoscopy (1/11/2013); and Enhance endolymphatic sac, dec sigmoid sinus, extnd facial recess apprch, mastoidectomy, bmt, combo (Left, 11/12/2015).          Social History:     Social History   Substance Use Topics     Smoking status: Former Smoker     Quit date: 8/17/1977     Smokeless tobacco: Never Used     Alcohol use No             Family History:   The  family history is not on file.    Family history   reviewed and updated in EPIC          Immunizations:     There is no immunization history on file for this patient.          Allergies:     Allergies   Allergen Reactions     Chocolate Other (See Comments)     migraines             Medications:     Current Facility-Administered Medications   Medication     naloxone (NARCAN) injection 0.1-0.4 mg     polyethylene glycol (MIRALAX/GLYCOLAX) Packet 17 g     ondansetron (ZOFRAN-ODT) ODT tab 4 mg    Or     ondansetron (ZOFRAN) injection 4 mg     prochlorperazine (COMPAZINE) injection 5 mg    Or     prochlorperazine (COMPAZINE) tablet 5 mg    Or     prochlorperazine (COMPAZINE) Suppository 12.5 mg     0.9% sodium chloride infusion     morphine (PF) injection 2-4 mg     cefTRIAXone (ROCEPHIN) intermittent infusion 1 g     lactobacillus rhamnosus (GG) (CULTURELL) capsule 1 capsule     pravastatin (PRAVACHOL) tablet 20 mg     insulin aspart (NovoLOG) inj (RAPID ACTING)     insulin aspart (NovoLOG) inj (RAPID ACTING)     glucose 40 % gel 15-30 g    Or     dextrose 50 % injection 25-50 mL    Or     glucagon injection 1 mg     tamsulosin (FLOMAX) capsule 0.4 mg             Review of Systems:   The Review of Systems is negative other than noted in the HPI     GENERAL: AAOX4, NAD  HEENT: GROSSLY NORMAL  CHEST: NO OBVIOUS MASS OR LESION, NORMAL RESPIRATIONS  HEART: NOT EXAMINED  ABDOMEN:  SOFT, NON DISTENDED  BACK, FLANK: MILD LEFT CVAT  GENITAL: NOT EXAMINED  SKIN: WARM, DRY, NORMAL TURGOR  EXTREMITIES: GROSSLY NORMAL WITH FULL ROM  NEURO: GROSSLY INTACT  PSYCH: RESPONDS APPROPRIATESLY NO OBVIOUS            Data:   All laboratory and imaging data in the past 24 hours reviewed  CT scan of the abdomen:   No masses, free air, abcesses or significant abnormalities  Kidneys: SMALL NON OBSTRUCTING LEFT RENAL CALCULUS AND 3-4 MM MID LEFT URETERAL CALCULUS WITH MILD HYDRO  CT scan interpreted by radiologist          Attestation:  Care  coordination / counseling time: 20 minutes  Face-to-face time: 20 minutes  Total time: 40 minutes    ELLIS STRONG MD

## 2017-11-30 NOTE — H&P
"DATE OF ADMISSION:  11/30/2017      PRIMARY CARE PHYSICIAN:  Jason Salter MD      CHIEF COMPLAINT:  Left-sided flank pain.      HISTORY OF PRESENT ILLNESS:  Ms. Kaitlyn Yao is a 72-year-old female patient with history noted below, including diabetes mellitus type 2, hypertension, dyslipidemia, and history of kidney stones, who presents with the above acute issues.  The patient noted symptoms starting yesterday.  She had left-sided \"aching\" flank pain.  She had nausea but no vomiting.  Felt chilled and low-grade temperature of 100.  She continued to have symptoms and therefore came in to [facility] for further evaluation today.      The patient was seen in the ER and vitals showed temperature 97.9 degrees, heart rate 83, blood pressure 152/77, respiratory rate 16, O2 saturations 98%.  She has laboratory evaluation which showed BMP which was normal.  CBC showed white count 12.8.  Urinalysis was grossly abnormal with greater than 182 white blood cells and positive leukocyte esterase.  She had a CT abdomen and pelvis without contrast that showed an obstructing mid left ureteral stone measuring 0.3 cm with mild to moderate left hydronephrosis; also a hyperdense lesion lower pole of the left kidney measuring less than 1 cm which is unchanged and noted to be likely hyperdense or proteinaceous cyst.  There were also some other chronic changes noted.  The patient was given a dose of ceftriaxone as well as IV fluids and analgesics.  Urology was contacted and a request for admission was made.      The patient denies any chest pain or shortness breath.  No diarrhea or bloody stools.      PAST MEDICAL HISTORY:   1.  Diabetes mellitus type 2.  Hemoglobin A1c 7.1 in 06/2017.   2.  Hypertension.   3.  Dyslipidemia.   4.  Rosacea.   5.  Urge incontinence.   6.  Spine disk disease with history of right-sided sciatica noted.   7.  History of acute   8.  History of nephrolithiasis including a right-sided staghorn calculus which " required surgery in the 1980s.   9.  History of left-sided Meniere's with a acute hearing loss on the left, for which she underwent surgery for this 11/2015.     PAST SURGICAL HISTORY:   1.  Status post tonsillectomy and adenoidectomy.   2.  Status post surgery for right-sided staghorn calculus 1985.   3.  Status post laparoscopic cholecystectomy in 2005.   4.  Status post Da Milagros supracervical hysterectomy; bilateral salpingo-oophorectomy; sacral sacrocolpopexy; abdominal adhesiolysis; TVT Exact sling/cystoscopy; and perineorrhaphy; performed 01/2013.   6.  Status post surgery for left-sided Meniere's including left endolymphatic sac enhancement; sigmoid sinus decompression, extended facial recess approach; and complete mastoidectomy; performed 11/2015.      ALLERGIES:  No known medication allergies.      HOME MEDICATIONS:   1.  Aspirin 81 mg a day.   2.  Biotin 5000 units a day.   3.  Vitamin D3.   4.  Coenzyme Q10 200 units a day.   5.  Exenatide 2 mg subcutaneously once a week.   6.  Irbesartan 300 mg a day.   7.  Lactobacillus 1 capsule day.   8.  Magnesium oxide 200 mg a day.   9.  Metronidazole 0.75% lotion topically b.i.d. p.r.n.   10.  Pravastatin 20 mg a day.   11.  Zinc 50 mg a day.      SOCIAL HISTORY:  The patient does not smoke or drink.  She is .  She has 5 children.  She is retired and used to work at the [employer] here at [facility] [employer] for 19 years.      FAMILY HISTORY:  Reviewed and noncontributory.      REVIEW OF SYSTEMS:  As noted in HPI, otherwise 10-point systems negative.      PHYSICAL EXAMINATION:     VITAL SIGNS:  Temperature 97.9 degrees, heart rate 83, blood pressure 150/77, respiratory rate 16, O2 saturation 98%.   GENERAL:  This is a 72-year-old female patient lying in bed.  She is conversant and friendly.   HEENT:  Pupils equal, round, reactive.  No scleral icterus or conjunctival injection.  Oropharynx reveals no gross erythema or exudate.   NECK:  No bruits,  JVD or adenopathy.   HEART:  Tachycardic, without murmurs, rubs, gallops.   LUNGS:  Diminished at the bases, no crackles or wheezes.   ABDOMEN:  Soft and nontender to light touch, nondistended, positive bowel sounds, no femoral bruits.   EXTREMITIES:  No significant edema with palpable dorsalis pedis pulses bilaterally.   NEUROLOGIC:  No gross focal motor or sensory deficits.      LABORATORY:  BMP was normal.  Glucose 248.  CBC showed white count 12.8, hemoglobin 14.5, platelets 208.  Urinalysis showed greater than 182 white blood cells, large leukocyte esterase, positive nitrites.  CT abdomen and pelvis as above.      ASSESSMENT AND PLAN:  Ms. Kaitlyn Yao is a 72-year-old female patient with history including diabetes mellitus type 2, hypertension and dyslipidemia, who presents with left flank pain, and found with leukocytosis, signs of urinary tract infection, and obstructing mid left ureteral stone with hydronephrosis.    1. Obstructing left ureteral stone with hydronephrosis and complicated urinary tract infection/pyelonephritis.    Presented 11/30 with left flank pain; initially evaluation showed leukocytosis and abnormal UA. CT abdomen/pelvis 11/30 showed obstructing mid left ureteral stone measuring 0.3 cm causing mild to moderate left hydronephrosis.  - Hydrate with IV fluids.    - Strain patient's urine.   - Continue ceftriaxone.    - PRN Morphine.  - Urology consultation, they have been contacted.     - Monitor cultures.     2.  Diabetes mellitus type 2.    [Prior to admission regimen consists of Exenatide 2 mg subcutaneously once a week.]  Hemoglobin A1c was 7.1 in 06/2017.   She last received exenatide 11/29.    - ISS.    3.  Benign essential hypertension.  [Prior to admission regimen consists of irbesartan 300 mg a day.]  - Continue irbesartan with hold parameters.    4.  Dyslipidemia.    - Continue pravastatin.    5.  Rosacea.    - Continue PRN topical metronidazole.      6.  Prophylaxis.    -  Pneumoboots and ambulation.      CODE STATUS:  Full code.        Jose Mckeon Jr., MD  [phone] (p)  Text Page            D: 2017 08:18   T: 2017 08:54   MT: RIYA      Name:     IGGY HOYT   MRN:      [id number]        Account:      [id number]   :      1945           Admitted:     [id number]      Document: [id number]       cc: Jason Salter MD

## 2017-11-30 NOTE — ANESTHESIA CARE TRANSFER NOTE
Patient: Kaitlyn Yao    Procedure(s):  CYSTOSCOPY, LEFT URETERAL STENT PLACEMENT - Wound Class: II-Clean Contaminated    Diagnosis: CYSTO, LEFT STENT PLACEMENT  SEPTIC  Diagnosis Additional Information: No value filed.    Anesthesia Type:   General, LMA, Periph. Nerve Block for postop pain     Note:  Airway :Face Mask  Patient transferred to:PACU  Comments: Pt to PACU. VSS. Report complete to RN.Handoff Report: Identifed the Patient, Identified the Reponsible Provider, Reviewed the pertinent medical history, Discussed the surgical course, Reviewed Intra-OP anesthesia mangement and issues during anesthesia, Set expectations for post-procedure period and Allowed opportunity for questions and acknowledgement of understanding      Vitals: (Last set prior to Anesthesia Care Transfer)    CRNA VITALS  11/30/2017 1457 - 11/30/2017 1532      11/30/2017             Pulse: 100    SpO2: 93 %    Resp Rate (set): 10                Electronically Signed By: Jenny Moreira CRNA, APRN CRNA  November 30, 2017  3:32 PM

## 2017-11-30 NOTE — IP AVS SNAPSHOT
MRN:[id number]                      After Visit Summary   11/30/2017    Kaitlyn Yao    MRN: [id number]           Thank you!     Thank you for choosing [facility] for your care. Our goal is always to provide you with excellent care. Hearing back from our patients is one way we can continue to improve our services. Please take a few minutes to complete the written survey that you may receive in the mail after you visit with us. Thank you!        Patient Information     Date Of Birth          1945        Designated Caregiver       Most Recent Value    Caregiver    Will someone help with your care after discharge? no      About your hospital stay     You were admitted on:  November 30, 2017 You last received care in the:  [place] [address] [facility]    You were discharged on:  December 2, 2017        Reason for your hospital stay       Left ureteral/kidney stone with complicated urinary tract infection.                  Who to Call     For medical emergencies, please call 911.  For non-urgent questions about your medical care, please call your primary care provider or clinic, [phone]  For questions related to your surgery, please call your surgery clinic        Attending Provider     Provider Specialty    Adithya Falcon MD Emergency Medicine    Fortino, Ericka Angulo MD Emergency Medicine    Jose Mckeon MD Internal Medicine       Primary Care Provider Office Phone # Fax #    Jason Salter [phone]      After Care Instructions     Activity       Your activity upon discharge: activity as tolerated            Diet       Follow this diet upon discharge: Orders Placed This Encounter      Low Fiber Diet                    Follow-up Appointments     Follow Up and recommended labs and tests       Follow up with primary care provider, Jason Salter, within 7 days for hospital follow- up.  No follow up labs or test are needed.     Follow up with   "Sajan, Fadden or Fallen in 10-14 days.                  Pending Results     No orders found from 2017 to 2017.            Statement of Approval     Ordered          17 0907  I have reviewed and agree with all the recommendations and orders detailed in this document.  EFFECTIVE NOW     Approved and electronically signed by:  Jose Mckeon MD           17 0827  I have reviewed and agree with all the recommendations and orders detailed in this document.  EFFECTIVE NOW     Comments:  Discharge if able to wean off of oxygen.   Approved and electronically signed by:  Jose Mckeon MD             Admission Information     Date & Time Provider Department Dept. Phone    2017 Jose Mckeon MD [place] [address] [facility] [phone]      Your Vitals Were     Blood Pressure Pulse Temperature Respirations Height Weight    136/60 81 98.1  F (36.7  C) 16 1.575 m (5' 2\") 76.7 kg (169 lb)    Pulse Oximetry BMI (Body Mass Index)                93% 30.91 kg/m2          MyChart Information     Kii lets you send messages to your doctor, view your test results, renew your prescriptions, schedule appointments and more. To sign up, go to [email or web address] . Click on \"Log in\" on the left side of the screen, which will take you to the Welcome page. Then click on \"Sign up Now\" on the right side of the page.     You will be asked to enter the access code listed below, as well as some personal information. Please follow the directions to create your username and password.     Your access code is: [email or web address]  Expires: 3/2/2018  8:15 AM     Your access code will  in 90 days. If you need help or a new code, please call your [facility] clinic or [phone].        Care EveryWhere ID     This is your Care EveryWhere ID. This could be used by other organizations to access your [facility] medical records  [id number]        Equal Access to Services     RODERICK DEWITT AH: Hadii aad " ana luisa Jo, warasheedda luqadaha, qaybta kaalmada helena, jeannette yvonin hayaan lianarajani grady lamendeztoney yuriy Gomez [facility] [phone].    ATENCIÓN: Si habla diegoañol, tiene a arora disposición servicios gratuitos de asistencia lingüística. Llame al [phone].    We comply with applicable federal civil rights laws and [place] laws. We do not discriminate on the basis of race, color, national origin, age, disability, sex, sexual orientation, or gender identity.               Review of your medicines      START taking        Dose / Directions    ciprofloxacin 500 MG tablet   Commonly known as:  CIPRO   Used for:  Acute pyelonephritis        Dose:  500 mg   Take 1 tablet (500 mg) by mouth 2 times daily   Quantity:  27 tablet   Refills:  0         CONTINUE these medicines which may have CHANGED, or have new prescriptions. If we are uncertain of the size of tablets/capsules you have at home, strength may be listed as something that might have changed.        Dose / Directions    magnesium oxide 200 MG Tabs   This may have changed:  medication strength   Used for:  Takes dietary supplements        Dose:  200 mg   Start taking on:  12/16/2017   Take 200 mg by mouth daily   Refills:  0         CONTINUE these medicines which have NOT CHANGED        Dose / Directions    ASPIRIN PO        Dose:  81 mg   Take 81 mg by mouth daily   Refills:  0       Biotin 5000 MCG Tabs        Dose:  5000 Units   Take 5,000 Units by mouth daily   Refills:  0       BYDUREON 2 MG recon susp for weekly inj   Generic drug:  exenatide ER        Dose:  2 mg   Inject 2 mg Subcutaneous once a week   Refills:  0       COQ-10 PO        Dose:  200 Units   Take 200 Units by mouth daily   Refills:  0       irbesartan 300 MG tablet   Commonly known as:  AVAPRO        Dose:  300 mg   Take 300 mg by mouth daily   Refills:  0       METROLOTION 0.75 % Lotn   Generic drug:  metroNIDAZOLE        Externally apply topically 2 times daily as needed   Refills:  0        PRAVACHOL PO        Dose:  20 mg   Take 20 mg by mouth daily.   Refills:  0       PROBIOTIC ACIDOPHILUS PO        Dose:  1 capsule   Take 1 capsule by mouth daily   Refills:  0       VITAMIN D-3 PO        Dose:  1000 Units   Take 1,000 Units by mouth daily   Refills:  0       Zinc 50 MG Caps        Dose:  50 mg   Take 50 mg by mouth daily   Refills:  0            Where to get your medicines      These medications were sent to [facility] [place], [place] - [address]  [address], [place] [address]     Phone:  [phone]     ciprofloxacin 500 MG tablet         Some of these will need a paper prescription and others can be bought over the counter. Ask your nurse if you have questions.     You don't need a prescription for these medications     magnesium oxide 200 MG Tabs               ANTIBIOTIC INSTRUCTION     You've Been Prescribed an Antibiotic - Now What?  Your healthcare team thinks that you or your loved one might have an infection. Some infections can be treated with antibiotics, which are powerful, life-saving drugs. Like all medications, antibiotics have side effects and should only be used when necessary. There are some important things you should know about your antibiotic treatment.      Your healthcare team may run tests before you start taking an antibiotic.    Your team may take samples (e.g., from your blood, urine or other areas) to run tests to look for bacteria. These test can be important to determine if you need an antibiotic at all and, if you do, which antibiotic will work best.      Within a few days, your healthcare team might change or even stop your antibiotic.    Your team may start you on an antibiotic while they are working to find out what is making you sick.    Your team might change your antibiotic because test results show that a different antibiotic would be better to treat your infection.    In some cases, once your team has more information, they  learn that you do not need an antibiotic at all. They may find out that you don't have an infection, or that the antibiotic you're taking won't work against your infection. For example, an infection caused by a virus can't be treated with antibiotics. Staying on an antibiotic when you don't need it is more likely to be harmful than helpful.      You may experience side effects from your antibiotic.    Like all medications, antibiotics have side effects. Some of these can be serious.    Let you healthcare team know if you have any known allergies when you are admitted to the hospital.    One significant side effect of nearly all antibiotics is the risk of severe and sometimes deadly diarrhea caused by Clostridium difficile (C. Difficile). This occurs when a person takes antibiotics because some good germs are destroyed. Antibiotic use allows C. diificile to take over, putting patients at high risk for this serious infection.    As a patient or caregiver, it is important to understand your or your loved one's antibiotic treatment. It is especially important for caregivers to speak up when patients can't speak for themselves. Here are some important questions to ask your healthcare team.    What infection is this antibiotic treating and how do you know I have that infection?    What side effects might occur from this antibiotic?    How long will I need to take this antibiotic?    Is it safe to take this antibiotic with other medications or supplements (e.g., vitamins) that I am taking?     Are there any special directions I need to know about taking this antibiotic? For example, should I take it with food?    How will I be monitored to know whether my infection is responding to the antibiotic?    What tests may help to make sure the right antibiotic is prescribed for me?      Information provided by:  www.cdc.gov/getsmart  U.S. Department of Health and Human Services  Centers for disease Control and  Prevention  National Center for Emerging and Zoonotic Infectious Diseases  Division of Healthcare Quality Promotion         Protect others around you: Learn how to safely use, store and throw away your medicines at www.disposemymeds.org.             Medication List: This is a list of all your medications and when to take them. Check marks below indicate your daily home schedule. Keep this list as a reference.      Medications           Morning Afternoon Evening Bedtime As Needed    ASPIRIN PO   Take 81 mg by mouth daily                                Biotin 5000 MCG Tabs   Take 5,000 Units by mouth daily                                BYDUREON 2 MG recon susp for weekly inj   Inject 2 mg Subcutaneous once a week   Generic drug:  exenatide ER                                ciprofloxacin 500 MG tablet   Commonly known as:  CIPRO   Take 1 tablet (500 mg) by mouth 2 times daily   Last time this was given:  500 mg on 12/2/2017  9:46 AM                                COQ-10 PO   Take 200 Units by mouth daily                                irbesartan 300 MG tablet   Commonly known as:  AVAPRO   Take 300 mg by mouth daily                                magnesium oxide 200 MG Tabs   Take 200 mg by mouth daily   Start taking on:  12/16/2017                                METROLOTION 0.75 % Lotn   Externally apply topically 2 times daily as needed   Generic drug:  metroNIDAZOLE                                PRAVACHOL PO   Take 20 mg by mouth daily.   Last time this was given:  20 mg on 12/2/2017  9:46 AM                                PROBIOTIC ACIDOPHILUS PO   Take 1 capsule by mouth daily                                VITAMIN D-3 PO   Take 1,000 Units by mouth daily                                Zinc 50 MG Caps   Take 50 mg by mouth daily

## 2017-11-30 NOTE — BRIEF OP NOTE
[facility] Brief Operative Note    Pre-operative diagnosis: CYSTO, LEFT STENT PLACEMENT  SEPTIC   Post-operative diagnosis Left ureteral stone and UTI      Procedure: Procedure(s):  CYSTOSCOPY, LEFT URETERAL STENT PLACEMENT - Wound Class: II-Clean Contaminated   Surgeon(s): Surgeon(s) and Role:     * Rosalino Mejia MD - Primary   Estimated blood loss: * No values recorded between 11/30/2017  3:08 PM and 11/30/2017  3:23 PM *    Specimens: * No specimens in log *   Findings: Cloudy urine - drained from Left kidney  6 Fr x 24 cm stent in Left ureter

## 2017-11-30 NOTE — ED NOTES
"[facility]  ED Nurse Handoff Report    ED Chief complaint: Flank Pain (left flank pain no radiation of pain. patient states started yesterday.  )      ED Diagnosis:   Final diagnoses:   Acute pyelonephritis   Calculus of ureter - 3 mm left mid obstructing       Code Status: Full Code    Allergies:   Allergies   Allergen Reactions     Chocolate Other (See Comments)     migraines       Activity level - Baseline/Home:  Independent    Activity Level - Current:   Independent     Needed?: No    Isolation: No  Infection: Not Applicable    Bariatric?: No    Vital Signs:   Vitals:    11/30/17 0544 11/30/17 0606   BP:  152/77   Resp:  16   Temp:  97.9  F (36.6  C)   SpO2:  98%   Weight: 76.7 kg (169 lb) 76.7 kg (169 lb)   Height: 1.549 m (5' 1\") 1.575 m (5' 2\")       Cardiac Rhythm: ,        Pain level: 0-10 Pain Scale: 2    Is this patient confused?: No    Patient Report: Initial Complaint: Left Flank pain  Focused Assessment: Kaitlyn is a very pleasant 72 year old female who arrives with her  from her own home.  Pt had left flank pain over the past couple days worsening last night. Hx of Kidney stones.  ED dx kidney stones with UTI.  Pt received pain meds, anti-enemics, NS. O2 sat went down to 82% when resting after morphine given. Placed on 3 litre O2 NC and sats went up to 95% sustained  Dr. Mckeon has seen pt in the ED for admission  Tests Performed: CT abdomen/pelvis,  Basic labs, UA/Culture  Abnormal Results: Serum Glucose 248, WBC 12.8 UTI.  Treatments provided: Rocephine 1g IVPB, NS 1 Litre, Toradol, 15mgIVP, Morphine 4mg IVP, Zofran 4mg IVP, Emotional support, Bare warmer gown, O2 supplement    Family Comments:  at bedside, very supportive    OBS brochure/video discussed/provided to patient: No    ED Medications:   Medications   ondansetron (ZOFRAN) injection 4 mg (4 mg Intravenous Given 11/30/17 0622)   ketorolac (TORADOL) injection 15 mg (15 mg Intravenous Given 11/30/17 " 0617)   0.9% sodium chloride BOLUS (1,000 mLs Intravenous New Bag 11/30/17 0619)   cefTRIAXone (ROCEPHIN) intermittent infusion 1 g (0 g Intravenous Stopped 11/30/17 0732)   morphine (PF) injection 4 mg (4 mg Intravenous Given 11/30/17 0633)       Drips infusing?:  No      ED NURSE PHONE NUMBER: [phone]

## 2017-12-01 LAB
ANION GAP SERPL CALCULATED.3IONS-SCNC: 6 MMOL/L (ref 3–14)
BASOPHILS # BLD AUTO: 0 10E9/L (ref 0–0.2)
BASOPHILS NFR BLD AUTO: 0.1 %
BUN SERPL-MCNC: 17 MG/DL (ref 7–30)
CALCIUM SERPL-MCNC: 8.4 MG/DL (ref 8.5–10.1)
CHLORIDE SERPL-SCNC: 110 MMOL/L (ref 94–109)
CO2 SERPL-SCNC: 25 MMOL/L (ref 20–32)
CREAT SERPL-MCNC: 0.6 MG/DL (ref 0.52–1.04)
DIFFERENTIAL METHOD BLD: NORMAL
EOSINOPHIL # BLD AUTO: 0 10E9/L (ref 0–0.7)
EOSINOPHIL NFR BLD AUTO: 0.1 %
ERYTHROCYTE [DISTWIDTH] IN BLOOD BY AUTOMATED COUNT: 13.9 % (ref 10–15)
GFR SERPL CREATININE-BSD FRML MDRD: >90 ML/MIN/1.7M2
GLUCOSE BLDC GLUCOMTR-MCNC: 140 MG/DL (ref 70–99)
GLUCOSE BLDC GLUCOMTR-MCNC: 152 MG/DL (ref 70–99)
GLUCOSE BLDC GLUCOMTR-MCNC: 188 MG/DL (ref 70–99)
GLUCOSE BLDC GLUCOMTR-MCNC: 237 MG/DL (ref 70–99)
GLUCOSE BLDC GLUCOMTR-MCNC: 250 MG/DL (ref 70–99)
GLUCOSE SERPL-MCNC: 153 MG/DL (ref 70–99)
HBA1C MFR BLD: 8.2 % (ref 4.3–6)
HCT VFR BLD AUTO: 36.5 % (ref 35–47)
HGB BLD-MCNC: 12.4 G/DL (ref 11.7–15.7)
IMM GRANULOCYTES # BLD: 0 10E9/L (ref 0–0.4)
IMM GRANULOCYTES NFR BLD: 0.1 %
INTERPRETATION ECG - MUSE: NORMAL
LYMPHOCYTES # BLD AUTO: 1.4 10E9/L (ref 0.8–5.3)
LYMPHOCYTES NFR BLD AUTO: 14.7 %
MCH RBC QN AUTO: 31.4 PG (ref 26.5–33)
MCHC RBC AUTO-ENTMCNC: 34 G/DL (ref 31.5–36.5)
MCV RBC AUTO: 92 FL (ref 78–100)
MONOCYTES # BLD AUTO: 0.7 10E9/L (ref 0–1.3)
MONOCYTES NFR BLD AUTO: 7.4 %
NEUTROPHILS # BLD AUTO: 7.6 10E9/L (ref 1.6–8.3)
NEUTROPHILS NFR BLD AUTO: 77.6 %
NRBC # BLD AUTO: 0 10*3/UL
NRBC BLD AUTO-RTO: 0 /100
PLATELET # BLD AUTO: 162 10E9/L (ref 150–450)
POTASSIUM SERPL-SCNC: 4.3 MMOL/L (ref 3.4–5.3)
RBC # BLD AUTO: 3.95 10E12/L (ref 3.8–5.2)
SODIUM SERPL-SCNC: 141 MMOL/L (ref 133–144)
WBC # BLD AUTO: 9.8 10E9/L (ref 4–11)

## 2017-12-01 PROCEDURE — 25000132 ZZH RX MED GY IP 250 OP 250 PS 637: Mod: GY | Performed by: INTERNAL MEDICINE

## 2017-12-01 PROCEDURE — 25000128 H RX IP 250 OP 636: Performed by: INTERNAL MEDICINE

## 2017-12-01 PROCEDURE — 12000000 ZZH R&B MED SURG/OB

## 2017-12-01 PROCEDURE — 99232 SBSQ HOSP IP/OBS MODERATE 35: CPT | Performed by: INTERNAL MEDICINE

## 2017-12-01 PROCEDURE — 80048 BASIC METABOLIC PNL TOTAL CA: CPT | Performed by: UROLOGY

## 2017-12-01 PROCEDURE — 00000146 ZZHCL STATISTIC GLUCOSE BY METER IP

## 2017-12-01 PROCEDURE — 85025 COMPLETE CBC W/AUTO DIFF WBC: CPT | Performed by: UROLOGY

## 2017-12-01 PROCEDURE — A9270 NON-COVERED ITEM OR SERVICE: HCPCS | Mod: GY | Performed by: INTERNAL MEDICINE

## 2017-12-01 PROCEDURE — 36415 COLL VENOUS BLD VENIPUNCTURE: CPT | Performed by: UROLOGY

## 2017-12-01 PROCEDURE — 83036 HEMOGLOBIN GLYCOSYLATED A1C: CPT | Performed by: UROLOGY

## 2017-12-01 RX ORDER — CIPROFLOXACIN 500 MG/1
500 TABLET, FILM COATED ORAL 2 TIMES DAILY
Qty: 26 TABLET | Refills: 0 | Status: SHIPPED | OUTPATIENT
Start: 2017-12-01 | End: 2017-12-01

## 2017-12-01 RX ORDER — CIPROFLOXACIN 500 MG/1
500 TABLET, FILM COATED ORAL 2 TIMES DAILY
Qty: 24 TABLET | Refills: 0 | Status: SHIPPED | OUTPATIENT
Start: 2017-12-02 | End: 2017-12-02

## 2017-12-01 RX ORDER — CIPROFLOXACIN 500 MG/1
500 TABLET, FILM COATED ORAL 2 TIMES DAILY
Qty: 12 TABLET | Refills: 0 | Status: SHIPPED | OUTPATIENT
Start: 2017-12-01 | End: 2017-12-01

## 2017-12-01 RX ADMIN — TAMSULOSIN HYDROCHLORIDE 0.4 MG: 0.4 CAPSULE ORAL at 09:10

## 2017-12-01 RX ADMIN — SODIUM CHLORIDE: 9 INJECTION, SOLUTION INTRAVENOUS at 04:01

## 2017-12-01 RX ADMIN — ACETAMINOPHEN 650 MG: 325 TABLET, FILM COATED ORAL at 16:54

## 2017-12-01 RX ADMIN — Medication 1 CAPSULE: at 09:10

## 2017-12-01 RX ADMIN — PRAVASTATIN SODIUM 20 MG: 40 TABLET ORAL at 09:09

## 2017-12-01 RX ADMIN — INSULIN ASPART 1 UNITS: 100 INJECTION, SOLUTION INTRAVENOUS; SUBCUTANEOUS at 17:50

## 2017-12-01 RX ADMIN — CEFTRIAXONE 1 G: 1 INJECTION, SOLUTION INTRAVENOUS at 06:26

## 2017-12-01 NOTE — PROGRESS NOTES
UROLOGY  S/P CYSTO, LEFT STENT FOR STONE AND PYELO  AWAKE,ALERT,O X 4. NAD.  AVSS EXCELLENT DIURESIS  WBC 9.8, CREATININE 0.60, UC PENDING  NO CVAT  IMPRESSION:  LEFT PYELONEPHRITIS  LEFT URETERAL CALCULUS  S/P STENT  IMPROVING  PLAN:  DC HOME ON ANTIBIOTICS 14 DAYS  FOLLOW UP KUB AND SEE AMY MOREIRA OR FALLEN 10-14 DAYS

## 2017-12-01 NOTE — PROGRESS NOTES
[facility]    Internal Medicine Hospitalist Progress Note  12/01/2017  I evaluated patient on the above date.    Jose Mckeon Jr., MD  [phone] (p)  Text Page (7 am to 6 pm)      Assessment & Plan   Ms. Kaitlyn Yao is a 72-year-old female patient with history including diabetes mellitus type 2, hypertension and dyslipidemia, who presented 11/30 with left flank pain, and found with leukocytosis, signs of urinary tract infection, and obstructing mid left ureteral stone with hydronephrosis.     1. Obstructing left ureteral stone with hydronephrosis, complicated urinary tract infection/pyelonephritis and sepsis - s/p L ureteral stent 11/30.    Presented 11/30 with left flank pain; initially evaluation showed leukocytosis and abnormal UA. Febrile, tachycardic, elevated WBC. CT abdomen/pelvis 11/30 showed obstructing mid left ureteral stone measuring 0.3 cm causing mild to moderate left hydronephrosis. Started on ceftriaxone 11/30. Seen by Urology and underwent L ureteral stent placement 11/30.   Micro: UC 11/30 pending.  - Continue ceftriaxone (started 11/30).  - Monitor cultures.     2.  Diabetes mellitus type 2.    [Prior to admission regimen consists of Exenatide 2 mg subcutaneously once a week.]  Hemoglobin A1c was 7.1 in 06/2017.   She last received exenatide 11/29.    Recent Labs   Lab Test  12/01/17   0705  12/01/17   0556  12/01/17   0223  11/30/17   2142  11/30/17   1716  11/30/17   1616   11/30/17   0600  06/02/14   1155   01/11/13   0835   GLC  153*   --    --    --    --    --    --   248*  104*   --   137*   BGM   --   152*  188*  245*  208*  199*   < >   --    --    < >   --     < > = values in this interval not displayed.   - Continue exenatide.  - Continue ISS.     3.  Benign essential hypertension.  [Prior to admission regimen consists of irbesartan 300 mg a day.]  - Continue irbesartan with hold parameters.     4.  Dyslipidemia.    - Continue pravastatin.     5.  Rosacea.    -  "Continue PRN topical metronidazole.       6.  Prophylaxis.    - Pneumoboots and ambulation.       CODE STATUS:  Full code.      Dispo.  - D/C home today.    Interval History   Doing well. Feeling much better. Denies SOB. Tolerating diet.    -Data reviewed today: I reviewed all new labs and imaging over the last 24 hours. I personally reviewed no images or EKG's today.    Physical Exam   Heart Rate: 82, Blood pressure 137/58, pulse 81, temperature 96.1  F (35.6  C), temperature source Oral, resp. rate 16, height 1.575 m (5' 2\"), weight 76.7 kg (169 lb), SpO2 92 %, not currently breastfeeding.  Vitals:    11/30/17 0544 11/30/17 0606 12/01/17 0559   Weight: 76.7 kg (169 lb) 76.7 kg (169 lb) 76.7 kg (169 lb)     Vital Signs with Ranges  Temp:  [96.1  F (35.6  C)-100.8  F (38.2  C)] 96.1  F (35.6  C)  Pulse:  [] 81  Heart Rate:  [] 82  Resp:  [12-35] 16  BP: (115-138)/(52-68) 137/58  SpO2:  [91 %-95 %] 92 %  Patient Vitals for the past 24 hrs:   BP Temp Temp src Pulse Heart Rate Resp SpO2 Weight   12/01/17 0721 137/58 96.1  F (35.6  C) Oral - 82 16 92 % -   12/01/17 0559 - - - - - - - 76.7 kg (169 lb)   12/01/17 0115 135/59 96.1  F (35.6  C) Oral 81 - 20 93 % -   11/30/17 2015 132/53 97.1  F (36.2  C) Oral - 89 20 95 % -   11/30/17 1659 138/61 99.2  F (37.3  C) Oral - 94 22 - -   11/30/17 1630 135/68 100.8  F (38.2  C) - - 93 (!) 35 - -   11/30/17 1615 128/62 100.8  F (38.2  C) - - 95 26 92 % -   11/30/17 1600 125/59 100.6  F (38.1  C) - - 96 20 93 % -   11/30/17 1545 124/61 100.4  F (38  C) - - 101 16 95 % -   11/30/17 1529 115/52 99.9  F (37.7  C) Temporal - - 12 94 % -   11/30/17 1440 135/67 100.6  F (38.1  C) Temporal 100 106 16 93 % -   11/30/17 0900 131/57 99.5  F (37.5  C) Oral - 107 16 91 % -   11/30/17 0826 - - - - - - 95 % -   11/30/17 0818 - - - - - - 93 % -   11/30/17 0814 - - - - - - 95 % -     I/O's Last 24 hours  I/O last 3 completed shifts:  In: 3023 [P.O.:360; I.V.:1663; IV " Piggyback:1000]  Out: 1750 [Urine:1750]    Constitutional: Alert, oriented, pleasant.  Respiratory: Diminished in bases. No crackles or wheezes.  Cardiovascular: RRR no m/r/g.  GI: Soft, nt, nd, +BS.  Skin/Integumen:   Other:        Data     Recent Labs  Lab 12/01/17  0705 11/30/17  0600   WBC 9.8 12.8*   HGB 12.4 14.5   MCV 92 92    208    138   POTASSIUM 4.3 4.8   CHLORIDE 110* 105   CO2 25 27   BUN 17 23   CR 0.60 0.71   ANIONGAP 6 6   INDRA 8.4* 9.0   * 248*     Recent Labs   Lab Test  12/01/17   0705  12/01/17   0556  12/01/17   0223  11/30/17   2142  11/30/17   1716  11/30/17   1616   11/30/17   0600  06/02/14   1155   01/11/13   0835   GLC  153*   --    --    --    --    --    --   248*  104*   --   137*   BGM   --   152*  188*  245*  208*  199*   < >   --    --    < >   --     < > = values in this interval not displayed.         No results found for this or any previous visit (from the past 24 hour(s)).    Medications   All medications were reviewed.    NaCl 100 mL/hr at 12/01/17 0401     lactated ringers 25 mL/hr at 11/30/17 1434       cefTRIAXone  1 g Intravenous Q24H     lactobacillus rhamnosus (GG)  1 capsule Oral Daily     pravastatin (PRAVACHOL) tablet 20 mg  20 mg Oral Daily     insulin aspart  1-3 Units Subcutaneous TID AC     insulin aspart  1-3 Units Subcutaneous At Bedtime     tamsulosin  0.4 mg Oral Daily     sodium chloride (PF)  3 mL Intracatheter Q8H

## 2017-12-01 NOTE — OP NOTE
DATE OF PROCEDURE:  11/30/2017      PREOPERATIVE DIAGNOSIS:  Left ureteral calculous and urosepsis.      POSTOPERATIVE DIAGNOSIS:  Left ureteral calculous and urosepsis.      PROCEDURE:  Cystoscopy with left ureteral stent placement.      SURGEON:  Rosalino Mejia MD      ANESTHESIA:  General.      ESTIMATED BLOOD LOSS:  Zero.      SPECIMENS:  None.      COMPLICATIONS:  None.      FINDINGS:  Cloudy urine drained from left kidney.      INDICATIONS:  Kaitlyn Yao is a 72-year-old female who is usually followed by Dr. Moeller.  She had a history of a staghorn calculous over 10 years ago.  She has had trouble with chronic cystitis.  She presented to the emergency room today with an acute onset of right flank pain.  She also had fevers and chills over the last 24 hours.  Urinalysis shows signs of infection.  CT scan was performed which showed a 3 mm stone in the mid left ureter (level L5).  There were no other stones in the kidneys or ureters.  She presents now to undergo urgent cystoscopy with left ureteral stent placement.  The risks and benefits were discussed with the patient.      PROCEDURE:  The patient was brought to the operating room, placed in a supine position.  After undergoing general anesthetic, she was placed in a low lithotomy position.  Her genitalia was prepped and draped in the usual sterile fashion.  A 22-Jamaican rigid cystoscope was inserted in the bladder.  There were no urothelial lesions or strictures.  She had grade I-II cystocele.  The trigone and both ureteral orifices were normal in appearance and location.  There were no tumors noted in the bladder.  There was some erythema on the posterior wall.      A 6-Jamaican ureteral catheter was then used to perform a left retrograde pyelogram.  This showed mild dilation of the calices.  A 0.035 Sensor wire was then passed up the left ureter.  The ureteral catheter was removed.  A 6-Jamaican x 24 cm stent was then placed in the left ureter.  The stent  could be seen curled in the renal pelvis and the bladder.  With placement of the stent, cloudy urine could be seen draining from the stent into the bladder.  The patient's bladder was emptied.  The cystoscope was removed.  She was put back in a supine position, awoken from anesthesia, and transferred back to the recovery room in good condition.      PLAN:  We will need to treat her infection for 1-2 weeks.  Once the infection has cleared, she will need to undergo a left ureteroscopy, with laser lithotripsy, stone removal, and stent exchange.  This can be done either by myself or Dr. Moeller.         BRETT REYES MD             D: 2017 15:30   T: 2017 08:43   MT: [id number]      Name:     IGGY HOYT   MRN:      [id number]        Account:        [id number]   :      1945           Procedure Date: 2017      Document: [id number]       cc: Jason Moeller MD       [facility]

## 2017-12-01 NOTE — PLAN OF CARE
Problem: Patient Care Overview  Goal: Plan of Care/Patient Progress Review  Outcome: No Change  POD#1 cysto w/L urethral stent placement. Pt is A&Ox4. VSS on 4LPM via NC. No c/o pain overnight. Reports urinary frequency and mild pain w/urination. Good UOP, no blood noted. PIV infusing NS at 100 mL/hr. Up SBA/ind, calls appropriately. Will continue to monitor.

## 2017-12-01 NOTE — PLAN OF CARE
Problem: Patient Care Overview  Goal: Plan of Care/Patient Progress Review  Outcome: No Change  POD#0 cysto w/L urethral stent placement. A/O. Tmax 99.2; PRN tylenol given x1, recheck 97.1 WNL. Other VSS. O2 stable on 4LPM via NC. EtCO2 33. Reports 4/10 pain, headache; PRN tylenol given x1 w/good relief. Reports urinary frequency and mild pain w/urination. Good UOP, no blood noted. PIV infusing NS at 100 mL/hr. Up SBA/ind, calls appropriately. Will continue to monitor.

## 2017-12-01 NOTE — DISCHARGE SUMMARY
[facility]  Discharge Summary        Kaitlyn Yao MRN# [id number]   YOB: 1945 Age: 72 year old     Date of Admission: 11/30/2017  Date of Discharge: 12/2/2017  Admitting Physician: Jose Mckeon MD  Discharge Physician: Jose Mckeon MD     Primary Provider: Jason Salter  Primary Care Physician Phone Number: [phone]         Discharge Diagnoses:   Obstructing left ureteral stone with hydronephrosis, complicated urinary tract infection/pyelonephritis and sepsis - s/p L ureteral stent 11/30.          Other Chronic Medical Problems:      1.  Diabetes mellitus type 2.    2.  Benign essential hypertension.  3.  Dyslipidemia.     4.  Rosacea.    5.  Prophylaxis.         Allergies:         Allergies   Allergen Reactions     Chocolate Other (See Comments)     migraines           Discharge Medications:        Current Discharge Medication List      START taking these medications    Details   ciprofloxacin (CIPRO) 500 MG tablet Take 1 tablet (500 mg) by mouth 2 times daily  Qty: 27 tablet, Refills: 0    Associated Diagnoses: Acute pyelonephritis      fluconazole (DIFLUCAN) 150 MG tablet Take 1 tablet (150 mg) by mouth once as needed  Qty: 1 tablet, Refills: 0    Comments: PRN vaginal yeast infection.  Associated Diagnoses: Yeast infection of the vagina         CONTINUE these medications which have CHANGED    Details   magnesium oxide 200 MG TABS Take 200 mg by mouth daily    Comments: Hold while taking ciprofloxacin.  Associated Diagnoses: Takes dietary supplements         CONTINUE these medications which have NOT CHANGED    Details   ASPIRIN PO Take 81 mg by mouth daily      Cholecalciferol (VITAMIN D-3 PO) Take 1,000 Units by mouth daily      Zinc 50 MG CAPS Take 50 mg by mouth daily      Lactobacillus (PROBIOTIC ACIDOPHILUS PO) Take 1 capsule by mouth daily      Biotin 5000 MCG TABS Take 5,000 Units by mouth daily      Coenzyme Q10 (COQ-10 PO) Take 200 Units by  mouth daily      exenatide (BYDUREON) 2 MG SUSR Inject 2 mg Subcutaneous once a week      irbesartan (AVAPRO) 300 MG tablet Take 300 mg by mouth daily       metroNIDAZOLE (METROLOTION) 0.75 % LOTN Externally apply topically 2 times daily as needed       Pravastatin Sodium (PRAVACHOL PO) Take 20 mg by mouth daily.                 Discharge Instructions and Follow-Up:      Follow-up Appointments     Follow Up and recommended labs and tests       Follow up with primary care provider, Jason Salter, within 7 days   for hospital follow- up.  No follow up labs or test are needed.     Follow up with Roberto Lilly or Marina in 10-14 days.                          Consultations This Hospital Stay:      Urology.        Admission History:      Please see the H&P by Jose Mckeon MD on 11/30/2017 for complete details. Briefly, Ms. Kaitlyn Yao is a 72-year-old female patient with history including diabetes mellitus type 2, hypertension and dyslipidemia, who presented 11/30 with left flank pain, and found with leukocytosis, signs of urinary tract infection, and obstructing mid left ureteral stone with hydronephrosis.        Problem Oriented Hospital Course:      Obstructing left ureteral stone with hydronephrosis, complicated urinary tract infection/pyelonephritis and sepsis - s/p L ureteral stent 11/30.    Presented 11/30 with left flank pain; initially evaluation showed leukocytosis and abnormal UA. Febrile, tachycardic, elevated WBC. CT abdomen/pelvis 11/30 showed obstructing mid left ureteral stone measuring 0.3 cm causing mild to moderate left hydronephrosis. Started on ceftriaxone 11/30. Seen by Urology and underwent L ureteral stent placement 11/30. Much improved after procedure. Pt afebrile. Tachycardia and WBC resolved at discharge.  Micro: UC 11/30 Enterobacter.  - Pt will discharge on ciprofloxacin to complete 14d antibiotics.  - Pt will f/u with Urology 10-14d.        Code Status:      Full  Code        Pending Results:      None.            Discharge Disposition:      Discharged to home.        Discharge Time:      Greater than 30 minutes.        Key Imaging Studies, Lab Findings and Procedures/Surgeries:        Results for orders placed or performed during the hospital encounter of 11/30/17   Abd/pelvis CT no contrast - Stone Protocol    Narrative    CT ABDOMEN AND PELVIS WITHOUT CONTRAST 11/30/2017 7:00 AM     HISTORY: Left flank pain. History of kidney stone, has a UTI now.      COMPARISON: CT abdomen and pelvis 3/1/2016.    TECHNIQUE: Axial images from the lung bases to the symphysis are  performed with additional coronal reformatted images. No contrast is  utilized.  Radiation dose for this scan was reduced using automated  exposure control, adjustment of the mA and/or kV according to patient  size, or iterative reconstruction technique.    FINDINGS: The lung bases are clear.    Abdomen: Scarring is noted along the lateral border right kidney. No  right urinary tract calculi. There is a 0.3 cm stone in the mid left  ureter on series 2, image 50 causing mild to moderate left  hydronephrosis. No other urinary tract calculi are evident. Hyperdense  lesion lower pole left kidney on image 45 measures less than 1 cm and  is stable since prior exam, probably a hyperdense or proteinaceous  cyst. Slightly larger simple-appearing cyst is noted in the left  kidney on image 42 which is also unchanged.    Calcified granulomas are noted in the liver and spleen which are  otherwise unremarkable. Prior cholecystectomy changes. The pancreas  and adrenal glands are unremarkable. No enlarged lymph nodes. Aorta  demonstrates calcified plaque without aneurysm. The bowel is normal in  caliber without obstruction, diverticulitis or appendicitis.    Pelvis: The bladder, uterus, adnexal regions and rectum are  unremarkable. No enlarged pelvic lymph nodes or free fluid.  Degenerative spine changes are present. No  aggressive-appearing bone  lesions are noted.      Impression    IMPRESSION:  1. Obstructing mid left ureteral stone measuring 0.3 cm. This causes  mild to moderate left hydronephrosis. No other urinary tract calculi  are evident bilaterally.  2. Hyperdense lesion lower pole left kidney measures less than 1 cm  and is unchanged. This is most likely a hyperdense or proteinaceous  cyst. Additional simple cyst left kidney is unchanged.  3. Scarring and/or tethering lateral aspect right kidney is stable and  likely postoperative in nature dating back to a prior CT from  9/7/2004.  4. Evidence of old granulomatous disease.    KAREN CHESTER MD     Surgery 11/30/2017:  CYSTO, LEFT STENT PLACEMENT

## 2017-12-02 VITALS
RESPIRATION RATE: 16 BRPM | OXYGEN SATURATION: 93 % | HEIGHT: 62 IN | TEMPERATURE: 98.1 F | WEIGHT: 169 LBS | DIASTOLIC BLOOD PRESSURE: 60 MMHG | BODY MASS INDEX: 31.1 KG/M2 | SYSTOLIC BLOOD PRESSURE: 136 MMHG | HEART RATE: 81 BPM

## 2017-12-02 LAB
BACTERIA SPEC CULT: ABNORMAL
BACTERIA SPEC CULT: ABNORMAL
GLUCOSE BLDC GLUCOMTR-MCNC: 141 MG/DL (ref 70–99)
GLUCOSE BLDC GLUCOMTR-MCNC: 172 MG/DL (ref 70–99)
Lab: ABNORMAL
SPECIMEN SOURCE: ABNORMAL

## 2017-12-02 PROCEDURE — 99207 ZZC CDG-CODE INCORRECT PER BILLING BASED ON TIME: CPT | Performed by: INTERNAL MEDICINE

## 2017-12-02 PROCEDURE — 25000132 ZZH RX MED GY IP 250 OP 250 PS 637: Mod: GY | Performed by: INTERNAL MEDICINE

## 2017-12-02 PROCEDURE — A9270 NON-COVERED ITEM OR SERVICE: HCPCS | Mod: GY | Performed by: INTERNAL MEDICINE

## 2017-12-02 PROCEDURE — 25000128 H RX IP 250 OP 636: Performed by: INTERNAL MEDICINE

## 2017-12-02 PROCEDURE — 00000146 ZZHCL STATISTIC GLUCOSE BY METER IP

## 2017-12-02 PROCEDURE — 99239 HOSP IP/OBS DSCHRG MGMT >30: CPT | Performed by: INTERNAL MEDICINE

## 2017-12-02 RX ORDER — FLUCONAZOLE 150 MG/1
150 TABLET ORAL
Qty: 1 TABLET | Refills: 0 | Status: SHIPPED | OUTPATIENT
Start: 2017-12-02 | End: 2018-10-31

## 2017-12-02 RX ORDER — CIPROFLOXACIN 500 MG/1
500 TABLET, FILM COATED ORAL 2 TIMES DAILY
Qty: 27 TABLET | Refills: 0 | Status: ON HOLD | OUTPATIENT
Start: 2017-12-02 | End: 2017-12-22

## 2017-12-02 RX ORDER — CIPROFLOXACIN 500 MG/1
500 TABLET, FILM COATED ORAL EVERY 12 HOURS SCHEDULED
Status: DISCONTINUED | OUTPATIENT
Start: 2017-12-02 | End: 2017-12-02 | Stop reason: HOSPADM

## 2017-12-02 RX ADMIN — TAMSULOSIN HYDROCHLORIDE 0.4 MG: 0.4 CAPSULE ORAL at 09:46

## 2017-12-02 RX ADMIN — CIPROFLOXACIN HYDROCHLORIDE 500 MG: 500 TABLET, FILM COATED ORAL at 09:46

## 2017-12-02 RX ADMIN — CEFTRIAXONE 1 G: 1 INJECTION, SOLUTION INTRAVENOUS at 05:41

## 2017-12-02 RX ADMIN — INSULIN ASPART 1 UNITS: 100 INJECTION, SOLUTION INTRAVENOUS; SUBCUTANEOUS at 08:33

## 2017-12-02 RX ADMIN — PRAVASTATIN SODIUM 20 MG: 40 TABLET ORAL at 09:46

## 2017-12-02 RX ADMIN — Medication 1 CAPSULE: at 09:46

## 2017-12-02 NOTE — PLAN OF CARE
Problem: Patient Care Overview  Goal: Plan of Care/Patient Progress Review  Outcome: No Change  A&Ox4. VSS on RA. POD# 2 L urethral stent placement. Up independently. Denies pain. Some urinary frequency. Regular diet. Possible discharge today. Continue to monitor.

## 2017-12-02 NOTE — PROVIDER NOTIFICATION
MD Notification      Person Name: Dr Mckeon    Date/Time: 12/2 1105    Interaction: Phone call    Purpose of Notification: Pt requesting Diflucan prior to d/c    Orders Received: Order placed, waiting from [place] pharmacy

## 2017-12-02 NOTE — PROGRESS NOTES
Pt is pain free. Tolerating stent  A: Left ureteral stone stented.  P: Discussed home with stent and f/up with Dr Moeller for further management   [facility]

## 2017-12-02 NOTE — PROGRESS NOTES
"[facility]    Internal Medicine Hospitalist Progress Note  12/02/2017  I evaluated patient on the above date.    Jose Mckeon Jr., MD  [phone] (p)  Text Page (7 am to 6 pm)      Assessment & Plan   UC growing Enterobacter aerogenes, resistant to ceftriaxone. Plan give cipro now; discharge on cipro to complete 2 weeks as previously planned.    D/C home.    Interval History   Tired, but not sleeping much here. Otherwise ready to go home.    -Data reviewed today: I reviewed all new labs and imaging over the last 24 hours. I personally reviewed no images or EKG's today.    Physical Exam   Heart Rate: (P) 84, Blood pressure (P) 136/60, pulse 81, temperature (P) 98.1  F (36.7  C), resp. rate (P) 16, height 1.575 m (5' 2\"), weight 76.7 kg (169 lb), SpO2 (P) 93 %, not currently breastfeeding.  Vitals:    11/30/17 0544 11/30/17 0606 12/01/17 0559   Weight: 76.7 kg (169 lb) 76.7 kg (169 lb) 76.7 kg (169 lb)     Vital Signs with Ranges  Temp:  [97  F (36.1  C)-100.1  F (37.8  C)] (P) 98.1  F (36.7  C)  Heart Rate:  [84-99] (P) 84  Resp:  [16] (P) 16  BP: (129-149)/(51-67) (P) 136/60  SpO2:  [93 %-95 %] (P) 93 %  Patient Vitals for the past 24 hrs:   BP Temp Temp src Heart Rate Resp SpO2   12/02/17 0740 (P) 136/60 (P) 98.1  F (36.7  C) - (P) 84 (P) 16 (P) 93 %   12/02/17 0010 132/53 97.7  F (36.5  C) Oral 88 16 95 %   12/01/17 2135 129/51 97  F (36.1  C) Oral 87 16 94 %   12/01/17 1554 144/56 100.1  F (37.8  C) Oral 97 16 93 %   12/01/17 1445 - 99.8  F (37.7  C) Oral - - -   12/01/17 1254 - 99.5  F (37.5  C) Oral - - -   12/01/17 1202 149/67 99.3  F (37.4  C) Oral 99 16 95 %   12/01/17 1112 - - - - - 95 %     I/O's Last 24 hours  I/O last 3 completed shifts:  In: 970 [P.O.:720; I.V.:250]  Out: 1750 [Urine:1750]    Constitutional: Alert, oriented, pleasant.  Respiratory: Diminished in bases. No crackles or wheezes.  Cardiovascular: RRR no m/r/g.  GI: Soft, nt, nd, +BS.  Skin/Integumen:   Other:        Data "     Recent Labs  Lab 12/01/17  0705 11/30/17  0600   WBC 9.8 12.8*   HGB 12.4 14.5   MCV 92 92    208    138   POTASSIUM 4.3 4.8   CHLORIDE 110* 105   CO2 25 27   BUN 17 23   CR 0.60 0.71   ANIONGAP 6 6   INDRA 8.4* 9.0   * 248*     Recent Labs   Lab Test  12/02/17   0748  12/02/17   0149  12/01/17   2134  12/01/17   1729  12/01/17   0758  12/01/17   0705   11/30/17   0600  06/02/14   1155   01/11/13   0835   GLC   --    --    --    --    --   153*   --   248*  104*   --   137*   BGM  172*  141*  250*  237*  140*   --    < >   --    --    < >   --     < > = values in this interval not displayed.         No results found for this or any previous visit (from the past 24 hour(s)).    Medications   All medications were reviewed.       ciprofloxacin  500 mg Oral Q12H ASHLEY     lactobacillus rhamnosus (GG)  1 capsule Oral Daily     pravastatin  20 mg Oral Daily     insulin aspart  1-3 Units Subcutaneous TID AC     insulin aspart  1-3 Units Subcutaneous At Bedtime     tamsulosin  0.4 mg Oral Daily     sodium chloride (PF)  3 mL Intracatheter Q8H

## 2017-12-02 NOTE — PLAN OF CARE
Problem: Pain, Acute (Adult)  Goal: Identify Related Risk Factors and Signs and Symptoms  Related risk factors and signs and symptoms are identified upon initiation of [employer] Clinical Practice Guideline (CPG).   Outcome: Improving  POD#1 cysto w/L urethral stent placement. Pt is A&Ox4. VSS on room air.  Denied pain.  CO chills and had facial flushing with fevers.  Tylenol given for T max 99.6 orally.  On rocephin.  Urinary frequency and mild discomfort w/ urination. No blood noted in urine.   Ambulated independently in halls. Nursing will continue to monitor.

## 2017-12-02 NOTE — PLAN OF CARE
Problem: Patient Care Overview  Goal: Plan of Care/Patient Progress Review  Outcome: No Change  POD# 1 L urethral stent placement. A&O, calls appropriately. Up independently. Denies pain. Some urinary frequency. Continue IV anx. Possible discharge tomorrow. Continue to monitor.

## 2017-12-02 NOTE — PLAN OF CARE
Problem: Patient Care Overview  Goal: Plan of Care/Patient Progress Review  Outcome: Adequate for Discharge Date Met: 12/02/17  Pt A&O, denies pain and all VSS.  Urinating adequately w/o issues.  Pt discharged today at 1130 via wheelchair escorted by [employer].  Discharge meds: Cipro and Diflucan, sent home with patient and discharge education completed.  Pt denied any further questions or concerns.

## 2017-12-19 NOTE — H&P (VIEW-ONLY)
"DATE OF ADMISSION:  11/30/2017      PRIMARY CARE PHYSICIAN:  Jason Salter MD      CHIEF COMPLAINT:  Left-sided flank pain.      HISTORY OF PRESENT ILLNESS:  Ms. Kaitlyn Yao is a 72-year-old female patient with history noted below, including diabetes mellitus type 2, hypertension, dyslipidemia, and history of kidney stones, who presents with the above acute issues.  The patient noted symptoms starting yesterday.  She had left-sided \"aching\" flank pain.  She had nausea but no vomiting.  Felt chilled and low-grade temperature of 100.  She continued to have symptoms and therefore came in to [facility] for further evaluation today.      The patient was seen in the ER and vitals showed temperature 97.9 degrees, heart rate 83, blood pressure 152/77, respiratory rate 16, O2 saturations 98%.  She has laboratory evaluation which showed BMP which was normal.  CBC showed white count 12.8.  Urinalysis was grossly abnormal with greater than 182 white blood cells and positive leukocyte esterase.  She had a CT abdomen and pelvis without contrast that showed an obstructing mid left ureteral stone measuring 0.3 cm with mild to moderate left hydronephrosis; also a hyperdense lesion lower pole of the left kidney measuring less than 1 cm which is unchanged and noted to be likely hyperdense or proteinaceous cyst.  There were also some other chronic changes noted.  The patient was given a dose of ceftriaxone as well as IV fluids and analgesics.  Urology was contacted and a request for admission was made.      The patient denies any chest pain or shortness breath.  No diarrhea or bloody stools.      PAST MEDICAL HISTORY:   1.  Diabetes mellitus type 2.  Hemoglobin A1c 7.1 in 06/2017.   2.  Hypertension.   3.  Dyslipidemia.   4.  Rosacea.   5.  Urge incontinence.   6.  Spine disk disease with history of right-sided sciatica noted.   7.  History of acute   8.  History of nephrolithiasis including a right-sided staghorn calculus which " required surgery in the 1980s.   9.  History of left-sided Meniere's with a acute hearing loss on the left, for which she underwent surgery for this 11/2015.     PAST SURGICAL HISTORY:   1.  Status post tonsillectomy and adenoidectomy.   2.  Status post surgery for right-sided staghorn calculus 1985.   3.  Status post laparoscopic cholecystectomy in 2005.   4.  Status post Da Milagros supracervical hysterectomy; bilateral salpingo-oophorectomy; sacral sacrocolpopexy; abdominal adhesiolysis; TVT Exact sling/cystoscopy; and perineorrhaphy; performed 01/2013.   6.  Status post surgery for left-sided Meniere's including left endolymphatic sac enhancement; sigmoid sinus decompression, extended facial recess approach; and complete mastoidectomy; performed 11/2015.      ALLERGIES:  No known medication allergies.      HOME MEDICATIONS:   1.  Aspirin 81 mg a day.   2.  Biotin 5000 units a day.   3.  Vitamin D3.   4.  Coenzyme Q10 200 units a day.   5.  Exenatide 2 mg subcutaneously once a week.   6.  Irbesartan 300 mg a day.   7.  Lactobacillus 1 capsule day.   8.  Magnesium oxide 200 mg a day.   9.  Metronidazole 0.75% lotion topically b.i.d. p.r.n.   10.  Pravastatin 20 mg a day.   11.  Zinc 50 mg a day.      SOCIAL HISTORY:  The patient does not smoke or drink.  She is .  She has 5 children.  She is retired and used to work at the [employer] here at [facility] for 19 years.      FAMILY HISTORY:  Reviewed and noncontributory.      REVIEW OF SYSTEMS:  As noted in HPI, otherwise 10-point systems negative.      PHYSICAL EXAMINATION:     VITAL SIGNS:  Temperature 97.9 degrees, heart rate 83, blood pressure 150/77, respiratory rate 16, O2 saturation 98%.   GENERAL:  This is a 72-year-old female patient lying in bed.  She is conversant and friendly.   HEENT:  Pupils equal, round, reactive.  No scleral icterus or conjunctival injection.  Oropharynx reveals no gross erythema or exudate.   NECK:  No bruits,  JVD or adenopathy.   HEART:  Tachycardic, without murmurs, rubs, gallops.   LUNGS:  Diminished at the bases, no crackles or wheezes.   ABDOMEN:  Soft and nontender to light touch, nondistended, positive bowel sounds, no femoral bruits.   EXTREMITIES:  No significant edema with palpable dorsalis pedis pulses bilaterally.   NEUROLOGIC:  No gross focal motor or sensory deficits.      LABORATORY:  BMP was normal.  Glucose 248.  CBC showed white count 12.8, hemoglobin 14.5, platelets 208.  Urinalysis showed greater than 182 white blood cells, large leukocyte esterase, positive nitrites.  CT abdomen and pelvis as above.      ASSESSMENT AND PLAN:  Ms. Kaitlyn Yao is a 72-year-old female patient with history including diabetes mellitus type 2, hypertension and dyslipidemia, who presents with left flank pain, and found with leukocytosis, signs of urinary tract infection, and obstructing mid left ureteral stone with hydronephrosis.    1. Obstructing left ureteral stone with hydronephrosis and complicated urinary tract infection/pyelonephritis.    Presented 11/30 with left flank pain; initially evaluation showed leukocytosis and abnormal UA. CT abdomen/pelvis 11/30 showed obstructing mid left ureteral stone measuring 0.3 cm causing mild to moderate left hydronephrosis.  - Hydrate with IV fluids.    - Strain patient's urine.   - Continue ceftriaxone.    - PRN Morphine.  - Urology consultation, they have been contacted.     - Monitor cultures.     2.  Diabetes mellitus type 2.    [Prior to admission regimen consists of Exenatide 2 mg subcutaneously once a week.]  Hemoglobin A1c was 7.1 in 06/2017.   She last received exenatide 11/29.    - ISS.    3.  Benign essential hypertension.  [Prior to admission regimen consists of irbesartan 300 mg a day.]  - Continue irbesartan with hold parameters.    4.  Dyslipidemia.    - Continue pravastatin.    5.  Rosacea.    - Continue PRN topical metronidazole.      6.  Prophylaxis.    -  Pneumoboots and ambulation.      CODE STATUS:  Full code.        Jose Mckeon Jr., MD  [phone] (p)  Text Page            D: 2017 08:18   T: 2017 08:54   MT: RIYA      Name:     IGGY HOYT   MRN:      [id number]        Account:      [id number]   :      1945           Admitted:     [id number]      Document: [id number]       cc: Jason Salter MD

## 2017-12-21 RX ORDER — TRIAMCINOLONE ACETONIDE 1 MG/G
CREAM TOPICAL 3 TIMES DAILY
Status: ON HOLD | COMMUNITY
End: 2020-02-19

## 2017-12-21 RX ORDER — NYSTATIN AND TRIAMCINOLONE ACETONIDE 100000; 1 [USP'U]/G; MG/G
CREAM TOPICAL 2 TIMES DAILY
Status: ON HOLD | COMMUNITY
End: 2020-02-19

## 2017-12-22 ENCOUNTER — ANESTHESIA EVENT (OUTPATIENT)
Dept: SURGERY | Facility: CLINIC | Age: 72
End: 2017-12-22
Payer: MEDICARE

## 2017-12-22 ENCOUNTER — ANESTHESIA (OUTPATIENT)
Dept: SURGERY | Facility: CLINIC | Age: 72
End: 2017-12-22
Payer: MEDICARE

## 2017-12-22 ENCOUNTER — HOSPITAL ENCOUNTER (OUTPATIENT)
Facility: CLINIC | Age: 72
Discharge: HOME | End: 2017-12-22
Attending: UROLOGY | Admitting: UROLOGY
Payer: MEDICARE

## 2017-12-22 ENCOUNTER — APPOINTMENT (OUTPATIENT)
Dept: GENERAL RADIOLOGY | Facility: CLINIC | Age: 72
End: 2017-12-22
Attending: UROLOGY
Payer: MEDICARE

## 2017-12-22 VITALS
DIASTOLIC BLOOD PRESSURE: 59 MMHG | SYSTOLIC BLOOD PRESSURE: 125 MMHG | OXYGEN SATURATION: 96 % | RESPIRATION RATE: 16 BRPM | TEMPERATURE: 96.4 F | HEIGHT: 62 IN

## 2017-12-22 DIAGNOSIS — B37.31 VAGINAL YEAST INFECTION: ICD-10-CM

## 2017-12-22 DIAGNOSIS — N20.1 LEFT URETERAL STONE: Primary | ICD-10-CM

## 2017-12-22 DIAGNOSIS — N30.00 ACUTE CYSTITIS WITHOUT HEMATURIA: ICD-10-CM

## 2017-12-22 PROCEDURE — 27210794 ZZH OR GENERAL SUPPLY STERILE: Performed by: UROLOGY

## 2017-12-22 PROCEDURE — 25000125 ZZHC RX 250: Performed by: NURSE ANESTHETIST, CERTIFIED REGISTERED

## 2017-12-22 PROCEDURE — 25000128 H RX IP 250 OP 636: Performed by: NURSE ANESTHETIST, CERTIFIED REGISTERED

## 2017-12-22 PROCEDURE — 36000050 ZZH SURGERY LEVEL 2 1ST 30 MIN: Performed by: UROLOGY

## 2017-12-22 PROCEDURE — 37000009 ZZH ANESTHESIA TECHNICAL FEE, EACH ADDTL 15 MIN: Performed by: UROLOGY

## 2017-12-22 PROCEDURE — 40000277 XR SURGERY CARM FLUORO LESS THAN 5 MIN W STILLS

## 2017-12-22 PROCEDURE — 27210995 ZZH RX 272: Performed by: UROLOGY

## 2017-12-22 PROCEDURE — 82365 CALCULUS SPECTROSCOPY: CPT | Performed by: UROLOGY

## 2017-12-22 PROCEDURE — 27211024 ZZHC OR SUPPLY OTHER OPNP: Performed by: UROLOGY

## 2017-12-22 PROCEDURE — 88300 SURGICAL PATH GROSS: CPT | Mod: 26 | Performed by: UROLOGY

## 2017-12-22 PROCEDURE — 71000012 ZZH RECOVERY PHASE 1 LEVEL 1 FIRST HR: Performed by: UROLOGY

## 2017-12-22 PROCEDURE — 88300 SURGICAL PATH GROSS: CPT | Performed by: UROLOGY

## 2017-12-22 PROCEDURE — 36000052 ZZH SURGERY LEVEL 2 EA 15 ADDTL MIN: Performed by: UROLOGY

## 2017-12-22 PROCEDURE — 40000170 ZZH STATISTIC PRE-PROCEDURE ASSESSMENT II: Performed by: UROLOGY

## 2017-12-22 PROCEDURE — 25000128 H RX IP 250 OP 636: Performed by: UROLOGY

## 2017-12-22 PROCEDURE — 37000008 ZZH ANESTHESIA TECHNICAL FEE, 1ST 30 MIN: Performed by: UROLOGY

## 2017-12-22 PROCEDURE — C1769 GUIDE WIRE: HCPCS | Performed by: UROLOGY

## 2017-12-22 PROCEDURE — 25800025 ZZH RX 258: Performed by: UROLOGY

## 2017-12-22 PROCEDURE — 71000027 ZZH RECOVERY PHASE 2 EACH 15 MINS: Performed by: UROLOGY

## 2017-12-22 PROCEDURE — C1758 CATHETER, URETERAL: HCPCS | Performed by: UROLOGY

## 2017-12-22 RX ORDER — NALOXONE HYDROCHLORIDE 0.4 MG/ML
.1-.4 INJECTION, SOLUTION INTRAMUSCULAR; INTRAVENOUS; SUBCUTANEOUS
Status: DISCONTINUED | OUTPATIENT
Start: 2017-12-22 | End: 2017-12-22 | Stop reason: HOSPADM

## 2017-12-22 RX ORDER — HYDROMORPHONE HYDROCHLORIDE 1 MG/ML
.3-.5 INJECTION, SOLUTION INTRAMUSCULAR; INTRAVENOUS; SUBCUTANEOUS EVERY 10 MIN PRN
Status: DISCONTINUED | OUTPATIENT
Start: 2017-12-22 | End: 2017-12-22 | Stop reason: HOSPADM

## 2017-12-22 RX ORDER — HYDROCODONE BITARTRATE AND ACETAMINOPHEN 5; 325 MG/1; MG/1
1 TABLET ORAL ONCE
Status: DISCONTINUED | OUTPATIENT
Start: 2017-12-22 | End: 2017-12-22 | Stop reason: HOSPADM

## 2017-12-22 RX ORDER — FENTANYL CITRATE 0.05 MG/ML
25-50 INJECTION, SOLUTION INTRAMUSCULAR; INTRAVENOUS
Status: DISCONTINUED | OUTPATIENT
Start: 2017-12-22 | End: 2017-12-22 | Stop reason: HOSPADM

## 2017-12-22 RX ORDER — LIDOCAINE HYDROCHLORIDE 20 MG/ML
INJECTION, SOLUTION INFILTRATION; PERINEURAL PRN
Status: DISCONTINUED | OUTPATIENT
Start: 2017-12-22 | End: 2017-12-22

## 2017-12-22 RX ORDER — SULFAMETHOXAZOLE/TRIMETHOPRIM 800-160 MG
1 TABLET ORAL 2 TIMES DAILY
Qty: 10 TABLET | Refills: 0 | Status: SHIPPED | OUTPATIENT
Start: 2017-12-22 | End: 2017-12-27

## 2017-12-22 RX ORDER — ONDANSETRON 4 MG/1
4 TABLET, ORALLY DISINTEGRATING ORAL EVERY 30 MIN PRN
Status: DISCONTINUED | OUTPATIENT
Start: 2017-12-22 | End: 2017-12-22 | Stop reason: HOSPADM

## 2017-12-22 RX ORDER — ONDANSETRON 2 MG/ML
4 INJECTION INTRAMUSCULAR; INTRAVENOUS EVERY 30 MIN PRN
Status: DISCONTINUED | OUTPATIENT
Start: 2017-12-22 | End: 2017-12-22 | Stop reason: HOSPADM

## 2017-12-22 RX ORDER — FENTANYL CITRATE 50 UG/ML
INJECTION, SOLUTION INTRAMUSCULAR; INTRAVENOUS PRN
Status: DISCONTINUED | OUTPATIENT
Start: 2017-12-22 | End: 2017-12-22

## 2017-12-22 RX ORDER — HYDROCODONE BITARTRATE AND ACETAMINOPHEN 5; 325 MG/1; MG/1
1-2 TABLET ORAL EVERY 4 HOURS PRN
Qty: 10 TABLET | Refills: 0 | Status: SHIPPED | OUTPATIENT
Start: 2017-12-22 | End: 2018-10-31

## 2017-12-22 RX ORDER — FENTANYL CITRATE 50 UG/ML
50-100 INJECTION, SOLUTION INTRAMUSCULAR; INTRAVENOUS
Status: CANCELLED | OUTPATIENT
Start: 2017-12-22

## 2017-12-22 RX ORDER — CIPROFLOXACIN 2 MG/ML
400 INJECTION, SOLUTION INTRAVENOUS
Status: COMPLETED | OUTPATIENT
Start: 2017-12-22 | End: 2017-12-22

## 2017-12-22 RX ORDER — FLUCONAZOLE 150 MG/1
150 TABLET ORAL ONCE
Qty: 1 TABLET | Refills: 0 | Status: SHIPPED | OUTPATIENT
Start: 2017-12-22 | End: 2017-12-22

## 2017-12-22 RX ORDER — MEPERIDINE HYDROCHLORIDE 25 MG/ML
12.5 INJECTION INTRAMUSCULAR; INTRAVENOUS; SUBCUTANEOUS
Status: DISCONTINUED | OUTPATIENT
Start: 2017-12-22 | End: 2017-12-22 | Stop reason: HOSPADM

## 2017-12-22 RX ORDER — SODIUM CHLORIDE, SODIUM LACTATE, POTASSIUM CHLORIDE, CALCIUM CHLORIDE 600; 310; 30; 20 MG/100ML; MG/100ML; MG/100ML; MG/100ML
INJECTION, SOLUTION INTRAVENOUS CONTINUOUS PRN
Status: DISCONTINUED | OUTPATIENT
Start: 2017-12-22 | End: 2017-12-22

## 2017-12-22 RX ORDER — ONDANSETRON 2 MG/ML
INJECTION INTRAMUSCULAR; INTRAVENOUS PRN
Status: DISCONTINUED | OUTPATIENT
Start: 2017-12-22 | End: 2017-12-22

## 2017-12-22 RX ORDER — PROPOFOL 10 MG/ML
INJECTION, EMULSION INTRAVENOUS CONTINUOUS PRN
Status: DISCONTINUED | OUTPATIENT
Start: 2017-12-22 | End: 2017-12-22

## 2017-12-22 RX ORDER — PROPOFOL 10 MG/ML
INJECTION, EMULSION INTRAVENOUS PRN
Status: DISCONTINUED | OUTPATIENT
Start: 2017-12-22 | End: 2017-12-22

## 2017-12-22 RX ORDER — SODIUM CHLORIDE, SODIUM LACTATE, POTASSIUM CHLORIDE, CALCIUM CHLORIDE 600; 310; 30; 20 MG/100ML; MG/100ML; MG/100ML; MG/100ML
INJECTION, SOLUTION INTRAVENOUS CONTINUOUS
Status: DISCONTINUED | OUTPATIENT
Start: 2017-12-22 | End: 2017-12-22 | Stop reason: HOSPADM

## 2017-12-22 RX ADMIN — SODIUM CHLORIDE, POTASSIUM CHLORIDE, SODIUM LACTATE AND CALCIUM CHLORIDE: 600; 310; 30; 20 INJECTION, SOLUTION INTRAVENOUS at 08:34

## 2017-12-22 RX ADMIN — PROPOFOL 170 MG: 10 INJECTION, EMULSION INTRAVENOUS at 08:34

## 2017-12-22 RX ADMIN — CIPROFLOXACIN 400 MG: 2 INJECTION INTRAVENOUS at 08:38

## 2017-12-22 RX ADMIN — LIDOCAINE HYDROCHLORIDE 100 MG: 20 INJECTION, SOLUTION INFILTRATION; PERINEURAL at 08:34

## 2017-12-22 RX ADMIN — PROPOFOL 150 MCG/KG/MIN: 10 INJECTION, EMULSION INTRAVENOUS at 08:34

## 2017-12-22 RX ADMIN — ONDANSETRON 4 MG: 2 INJECTION INTRAMUSCULAR; INTRAVENOUS at 08:55

## 2017-12-22 RX ADMIN — FENTANYL CITRATE 50 MCG: 50 INJECTION, SOLUTION INTRAMUSCULAR; INTRAVENOUS at 08:34

## 2017-12-22 RX ADMIN — MIDAZOLAM 2 MG: 1 INJECTION INTRAMUSCULAR; INTRAVENOUS at 08:33

## 2017-12-22 ASSESSMENT — COPD QUESTIONNAIRES: COPD: 0

## 2017-12-22 ASSESSMENT — LIFESTYLE VARIABLES: TOBACCO_USE: 0

## 2017-12-22 NOTE — ANESTHESIA PREPROCEDURE EVALUATION
Anesthesia Evaluation     . Pt has had prior anesthetic.     No history of anesthetic complications          ROS/MED HX    ENT/Pulmonary:      (-) tobacco use, asthma, COPD and sleep apnea   Neurologic:       Cardiovascular:     (+) Dyslipidemia, hypertension----. : . . . :. .      (-) CAD, CHF and FOWLER   METS/Exercise Tolerance:     Hematologic:         Musculoskeletal:         GI/Hepatic:     (+) Other GI/Hepatic IBS     (-) GERD and liver disease   Renal/Genitourinary:     (+) Nephrolithiasis ,    (-) renal disease   Endo:     (+) type II DM .      Psychiatric:         Infectious Disease:         Malignancy:         Other:                     Physical Exam  Normal systems: dental    Airway   Mallampati: I  TM distance: >3 FB  Neck ROM: full    Dental     Cardiovascular   Rhythm and rate: regular and normal  (-) no murmur    Pulmonary    breath sounds clear to auscultation                    Anesthesia Plan      History & Physical Review  History and physical reviewed and following examination; no interval change.    ASA Status:  2 .    NPO Status:  > 8 hours    Plan for General and LMA with Intravenous induction. Maintenance will be TIVA.    PONV prophylaxis:  Ondansetron (or other 5HT-3)       Postoperative Care  Postoperative pain management:  IV analgesics.      Consents  Anesthetic plan, risks, benefits and alternatives discussed with:  Patient..                          .

## 2017-12-22 NOTE — IP AVS SNAPSHOT
[facility] PreOP/Phase II    [address]    [place] [address]    Phone:  [phone]                                       After Visit Summary   12/22/2017    Kaitlyn Yao    MRN: [id number]           After Visit Summary Signature Page     I have received my discharge instructions, and my questions have been answered. I have discussed any challenges I see with this plan with the nurse or doctor.    ..........................................................................................................................................  Patient/Patient Representative Signature      ..........................................................................................................................................  Patient Representative Print Name and Relationship to Patient    ..................................................               ................................................  Date                                            Time    ..........................................................................................................................................  Reviewed by Signature/Title    ...................................................              ..............................................  Date                                                            Time

## 2017-12-22 NOTE — ANESTHESIA CARE TRANSFER NOTE
Patient: Kaitlyn Yao    Procedure(s):  CYSTOSCOPY, LEFT URETEROSCOPY, LEFT URETERAL STENT REMOVAL, AND STONE EXTRACTION - Wound Class: II-Clean Contaminated   - Wound Class: II-Clean Contaminated    Diagnosis: URETERAL STONE, LEFT   Diagnosis Additional Information: No value filed.    Anesthesia Type:   General, LMA     Note:  Airway :Face Mask  Patient transferred to:PACU  Comments: At end of procedure, spontaneous respirations, adequate tidal volumes, followed commands to voice, LMA removed atraumatically, airway patent after LMA removal. Oxygen via facemask at 6 liters per minute to PACU. Oxygen tubing connected to wall O2 in PACU, SpO2, NiBP, and EKG monitors and alarms on and functioning, Margarita Hugger warmer connected to patient gown, report on patient's clinical status given to PACU RN, RN questions answered.Handoff Report: Identifed the Patient, Identified the Reponsible Provider, Reviewed the pertinent medical history, Discussed the surgical course, Reviewed Intra-OP anesthesia mangement and issues during anesthesia, Set expectations for post-procedure period and Allowed opportunity for questions and acknowledgement of understanding      Vitals: (Last set prior to Anesthesia Care Transfer)    CRNA VITALS  12/22/2017 0836 - 12/22/2017 0911      12/22/2017             Resp Rate (set): 10                Electronically Signed By: MARIAA Mendieta CRNA  December 22, 2017  9:11 AM

## 2017-12-22 NOTE — DISCHARGE INSTRUCTIONS
Same Day Surgery Discharge Instructions for  Sedation and General Anesthesia       It's not unusual to feel dizzy, light-headed or faint for up to 24 hours after surgery or while taking pain medication.  If you have these symptoms: sit for a few minutes before standing and have someone assist you when you get up to walk or use the bathroom.      You should rest and relax for the next 24 hours. We recommend you make arrangements to have an adult stay with you for at least 24 hours after your discharge.  Avoid hazardous and strenuous activity.      DO NOT DRIVE any vehicle or operate mechanical equipment for 24 hours following the end of your surgery.  Even though you may feel normal, your reactions may be affected by the medication you have received.      Do not drink alcoholic beverages for 24 hours following surgery.       Slowly progress to your regular diet as you feel able. It's not unusual to feel nauseated and/or vomit after receiving anesthesia.  If you develop these symptoms, drink clear liquids (apple juice, ginger ale, broth, 7-up, etc. ) until you feel better.  If your nausea and vomiting persists for 24 hours, please notify your surgeon.        All narcotic pain medications, along with inactivity and anesthesia, can cause constipation. Drinking plenty of liquids and increasing fiber intake will help.      For any questions of a medical nature, call your surgeon.      Do not make important decisions for 24 hours.      If you had general anesthesia, you may have a sore throat for a couple of days related to the breathing tube used during surgery.  You may use Cepacol lozenges to help with this discomfort.  If it worsens or if you develop a fever, contact your surgeon.       If you feel your pain is not well managed with the pain medications prescribed by your surgeon, please contact your surgeon's office to let them know so they can address your concerns.     Cystoscopy Discharge  Instructions      Diet:    Return to the diet that you were on before the procedure, unless you are given specific diet instructions.    It is important to drink 6-8 glasses of fluids per day at home - at least 3-4 glasses should be water.    Activity:    Walk short distances and increase as your strength allows.    You may climb stairs.    Do not do strenuous exercise or heavy lifting until approved by surgeon.    Do not drive while taking narcotic pain medications.    Bathing:    You may take a shower.    Call your physician if these signs/symptoms are present:    Pain that is not relieved by a short rest or ordered pain medications.    Temperature at or above 101.0 F or chills.    Inability or difficulty urinating.  Excessive blood in urine.    Any questions or concerns.      **If you have questions or concerns about your procedure,   call Dr. Mejia at [phone]**

## 2017-12-22 NOTE — IP AVS SNAPSHOT
MRN:[id number]                      After Visit Summary   12/22/2017    Kaitlyn Yao    MRN: [id number]           Thank you!     Thank you for choosing [facility] for your care. Our goal is always to provide you with excellent care. Hearing back from our patients is one way we can continue to improve our services. Please take a few minutes to complete the written survey that you may receive in the mail after you visit with us. Thank you!        Patient Information     Date Of Birth          1945        About your hospital stay     You were admitted on:  December 22, 2017 You last received care in the:  [facility] PreOP/Phase II    You were discharged on:  December 22, 2017       Who to Call     For medical emergencies, please call 911.  For non-urgent questions about your medical care, please call your primary care provider or clinic, [phone]  For questions related to your surgery, please call your surgery clinic        Attending Provider     Provider Rosalino Connell MD Urology       Primary Care Provider Office Phone # Fax #    Jason Jimena Salter [phone]      After Care Instructions     Diet Instructions       Resume pre procedure diet            Discharge Instructions       Resume Aspirin / warfarin (COUMADIN) when urine is clear to faint pink            Discharge Instructions       Patient to arrange for follow up appointment with Dr. Mejia in 2-4 weeks - office [phone]            Encourage fluids       Encourage fluids at home to keep urine clear to light pink            No Alcohol       for 24 hours post procedure            No driving or operating machinery        until the day after procedure                  Further instructions from your care team       Same Day Surgery Discharge Instructions for  Sedation and General Anesthesia       It's not unusual to feel dizzy, light-headed or faint for up to 24 hours after surgery or  while taking pain medication.  If you have these symptoms: sit for a few minutes before standing and have someone assist you when you get up to walk or use the bathroom.      You should rest and relax for the next 24 hours. We recommend you make arrangements to have an adult stay with you for at least 24 hours after your discharge.  Avoid hazardous and strenuous activity.      DO NOT DRIVE any vehicle or operate mechanical equipment for 24 hours following the end of your surgery.  Even though you may feel normal, your reactions may be affected by the medication you have received.      Do not drink alcoholic beverages for 24 hours following surgery.       Slowly progress to your regular diet as you feel able. It's not unusual to feel nauseated and/or vomit after receiving anesthesia.  If you develop these symptoms, drink clear liquids (apple juice, ginger ale, broth, 7-up, etc. ) until you feel better.  If your nausea and vomiting persists for 24 hours, please notify your surgeon.        All narcotic pain medications, along with inactivity and anesthesia, can cause constipation. Drinking plenty of liquids and increasing fiber intake will help.      For any questions of a medical nature, call your surgeon.      Do not make important decisions for 24 hours.      If you had general anesthesia, you may have a sore throat for a couple of days related to the breathing tube used during surgery.  You may use Cepacol lozenges to help with this discomfort.  If it worsens or if you develop a fever, contact your surgeon.       If you feel your pain is not well managed with the pain medications prescribed by your surgeon, please contact your surgeon's office to let them know so they can address your concerns.     Cystoscopy Discharge Instructions      Diet:    Return to the diet that you were on before the procedure, unless you are given specific diet instructions.    It is important to drink 6-8 glasses of fluids per day at home  "- at least 3-4 glasses should be water.    Activity:    Walk short distances and increase as your strength allows.    You may climb stairs.    Do not do strenuous exercise or heavy lifting until approved by surgeon.    Do not drive while taking narcotic pain medications.    Bathing:    You may take a shower.    Call your physician if these signs/symptoms are present:    Pain that is not relieved by a short rest or ordered pain medications.    Temperature at or above 101.0 F or chills.    Inability or difficulty urinating.  Excessive blood in urine.    Any questions or concerns.      **If you have questions or concerns about your procedure,   call Dr. Mejia at [phone]**          Pending Results     Date and Time Order Name Status Description    2017 09 Surgical pathology exam In process     2017 0900 XR Surgery LETA Fluoro L/T 5 Min w Stills In process     2017 0854 Stone analysis In process             Admission Information     Date & Time Provider Department Dept. Phone    2017 Rosalino Mejia MD [facility] PreOP/Phase [phone]      Your Vitals Were     Blood Pressure Temperature Respirations Height Pulse Oximetry       109/81 96.4  F (35.8  C) 12 1.575 m (5' 2\") 92%       [facility] Information     [employer] ([employer]) lets you send messages to your doctor, view your test results, renew your prescriptions, schedule appointments and more. To sign up, go to [email or web address] ([email or web address]) . Click on \"Log in\" on the left side of the screen, which will take you to the Welcome page. Then click on \"Sign up Now\" on the right side of the page.     You will be asked to enter the access code listed below, as well as some personal information. Please follow the directions to create your username and password.     Your access code is: [email or web address]  Expires: 3/2/2018  8:15 AM     Your access code will  in 90 days. If you need help or a new code, please call your [facility] clinic or " [phone].        Care EveryWhere ID     This is your Care EveryWhere ID. This could be used by other organizations to access your [facility] medical records  [id number]        Equal Access to Services     RODERICK DEWITT : Hadii aad ku hadsilasdiego Deysi, warasheedda luqadaha, qaybta kaalmada helena, jeannette ahmadi lianarajani grady nicanor wilkins. So [facility] [phone].    ATENCIÓN: Si habla español, tiene a arora disposición servicios gratuitos de asistencia lingüística. Llame al [phone].    We comply with applicable federal civil rights laws and [place] laws. We do not discriminate on the basis of race, color, national origin, age, disability, sex, sexual orientation, or gender identity.               Review of your medicines      START taking        Dose / Directions    HYDROcodone-acetaminophen 5-325 MG per tablet   Commonly known as:  NORCO   Used for:  Left ureteral stone        Dose:  1-2 tablet   Take 1-2 tablets by mouth every 4 hours as needed for moderate to severe pain (Moderate to Severe Pain)   Quantity:  10 tablet   Refills:  0       sulfamethoxazole-trimethoprim 800-160 MG per tablet   Commonly known as:  BACTRIM DS/SEPTRA DS   Used for:  Acute cystitis without hematuria        Dose:  1 tablet   Take 1 tablet by mouth 2 times daily for 5 days   Quantity:  10 tablet   Refills:  0         CONTINUE these medicines which may have CHANGED, or have new prescriptions. If we are uncertain of the size of tablets/capsules you have at home, strength may be listed as something that might have changed.        Dose / Directions    * fluconazole 150 MG tablet   Commonly known as:  DIFLUCAN   This may have changed:  Another medication with the same name was added. Make sure you understand how and when to take each.   Used for:  Yeast infection of the vagina        Dose:  150 mg   Take 1 tablet (150 mg) by mouth once as needed   Quantity:  1 tablet   Refills:  0       * fluconazole 150 MG tablet   Commonly known as:   DIFLUCAN   This may have changed:  You were already taking a medication with the same name, and this prescription was added. Make sure you understand how and when to take each.   Used for:  Vaginal yeast infection        Dose:  150 mg   Take 1 tablet (150 mg) by mouth once for 1 dose   Quantity:  1 tablet   Refills:  0       * Notice:  This list has 2 medication(s) that are the same as other medications prescribed for you. Read the directions carefully, and ask your doctor or other care provider to review them with you.      CONTINUE these medicines which have NOT CHANGED        Dose / Directions    ASPIRIN PO        Dose:  81 mg   Take 81 mg by mouth daily   Refills:  0       BIOTIN PO        Dose:  2500 mcg   Take 2,500 mcg by mouth 2 times daily   Refills:  0       BYDUREON 2 MG recon susp for weekly inj   Generic drug:  exenatide ER        Dose:  2 mg   Inject 2 mg Subcutaneous once a week   Refills:  0       COQ-10 PO        Dose:  300 mg   Take 300 mg by mouth daily   Refills:  0       irbesartan 300 MG tablet   Commonly known as:  AVAPRO        Dose:  300 mg   Take 300 mg by mouth daily   Refills:  0       METROLOTION 0.75 % Lotn   Generic drug:  metroNIDAZOLE        Externally apply topically 2 times daily as needed   Refills:  0       nystatin-triamcinolone cream   Commonly known as:  MYCOLOG II        Apply topically 2 times daily   Refills:  0       ZIMMERMAN COLON HEALTH PO        Dose:  1 capsule   Take 1 capsule by mouth daily   Refills:  0       PRAVACHOL PO        Dose:  20 mg   Take 20 mg by mouth daily.   Refills:  0       PROBIOTIC ACIDOPHILUS PO        Dose:  1 capsule   Take 1 capsule by mouth daily   Refills:  0       triamcinolone 0.1 % cream   Commonly known as:  KENALOG        Apply topically 3 times daily   Refills:  0         STOP taking     CEPHALEXIN PO                Where to get your medicines      These medications were sent to [facility] [place], [place] - [address]   [address] [place] [address]     Phone:  [phone]     fluconazole 150 MG tablet    sulfamethoxazole-trimethoprim 800-160 MG per tablet         Some of these will need a paper prescription and others can be bought over the counter. Ask your nurse if you have questions.     Bring a paper prescription for each of these medications     HYDROcodone-acetaminophen 5-325 MG per tablet               ANTIBIOTIC INSTRUCTION     You've Been Prescribed an Antibiotic - Now What?  Your healthcare team thinks that you or your loved one might have an infection. Some infections can be treated with antibiotics, which are powerful, life-saving drugs. Like all medications, antibiotics have side effects and should only be used when necessary. There are some important things you should know about your antibiotic treatment.      Your healthcare team may run tests before you start taking an antibiotic.    Your team may take samples (e.g., from your blood, urine or other areas) to run tests to look for bacteria. These test can be important to determine if you need an antibiotic at all and, if you do, which antibiotic will work best.      Within a few days, your healthcare team might change or even stop your antibiotic.    Your team may start you on an antibiotic while they are working to find out what is making you sick.    Your team might change your antibiotic because test results show that a different antibiotic would be better to treat your infection.    In some cases, once your team has more information, they learn that you do not need an antibiotic at all. They may find out that you don't have an infection, or that the antibiotic you're taking won't work against your infection. For example, an infection caused by a virus can't be treated with antibiotics. Staying on an antibiotic when you don't need it is more likely to be harmful than helpful.      You may experience side effects from your antibiotic.    Like all  medications, antibiotics have side effects. Some of these can be serious.    Let you healthcare team know if you have any known allergies when you are admitted to the hospital.    One significant side effect of nearly all antibiotics is the risk of severe and sometimes deadly diarrhea caused by Clostridium difficile (C. Difficile). This occurs when a person takes antibiotics because some good germs are destroyed. Antibiotic use allows C. diificile to take over, putting patients at high risk for this serious infection.    As a patient or caregiver, it is important to understand your or your loved one's antibiotic treatment. It is especially important for caregivers to speak up when patients can't speak for themselves. Here are some important questions to ask your healthcare team.    What infection is this antibiotic treating and how do you know I have that infection?    What side effects might occur from this antibiotic?    How long will I need to take this antibiotic?    Is it safe to take this antibiotic with other medications or supplements (e.g., vitamins) that I am taking?     Are there any special directions I need to know about taking this antibiotic? For example, should I take it with food?    How will I be monitored to know whether my infection is responding to the antibiotic?    What tests may help to make sure the right antibiotic is prescribed for me?      Information provided by:  www.cdc.gov/getsmart  U.S. Department of Health and Human Services  Centers for disease Control and Prevention  National Center for Emerging and Zoonotic Infectious Diseases  Division of Healthcare Quality Promotion         Protect others around you: Learn how to safely use, store and throw away your medicines at www.disposemymeds.org.             Medication List: This is a list of all your medications and when to take them. Check marks below indicate your daily home schedule. Keep this list as a reference.      Medications            Morning Afternoon Evening Bedtime As Needed    ASPIRIN PO   Take 81 mg by mouth daily                                BIOTIN PO   Take 2,500 mcg by mouth 2 times daily                                BYDUREON 2 MG recon susp for weekly inj   Inject 2 mg Subcutaneous once a week   Generic drug:  exenatide ER                                COQ-10 PO   Take 300 mg by mouth daily                                * fluconazole 150 MG tablet   Commonly known as:  DIFLUCAN   Take 1 tablet (150 mg) by mouth once as needed                                * fluconazole 150 MG tablet   Commonly known as:  DIFLUCAN   Take 1 tablet (150 mg) by mouth once for 1 dose                                HYDROcodone-acetaminophen 5-325 MG per tablet   Commonly known as:  NORCO   Take 1-2 tablets by mouth every 4 hours as needed for moderate to severe pain (Moderate to Severe Pain)                                irbesartan 300 MG tablet   Commonly known as:  AVAPRO   Take 300 mg by mouth daily                                METROLOTION 0.75 % Lotn   Externally apply topically 2 times daily as needed   Generic drug:  metroNIDAZOLE                                nystatin-triamcinolone cream   Commonly known as:  MYCOLOG II   Apply topically 2 times daily                                ZIMMERMAN COLON HEALTH PO   Take 1 capsule by mouth daily                                PRAVACHOL PO   Take 20 mg by mouth daily.                                PROBIOTIC ACIDOPHILUS PO   Take 1 capsule by mouth daily                                sulfamethoxazole-trimethoprim 800-160 MG per tablet   Commonly known as:  BACTRIM DS/SEPTRA DS   Take 1 tablet by mouth 2 times daily for 5 days                                triamcinolone 0.1 % cream   Commonly known as:  KENALOG   Apply topically 3 times daily                                * Notice:  This list has 2 medication(s) that are the same as other medications prescribed for you. Read the directions  carefully, and ask your doctor or other care provider to review them with you.

## 2017-12-22 NOTE — ANESTHESIA POSTPROCEDURE EVALUATION
Patient: Kaitlyn Yao    Procedure(s):  CYSTOSCOPY, LEFT URETEROSCOPY, LEFT URETERAL STENT REMOVAL, AND STONE EXTRACTION - Wound Class: II-Clean Contaminated   - Wound Class: II-Clean Contaminated    Diagnosis:URETERAL STONE, LEFT   Diagnosis Additional Information: No value filed.    Anesthesia Type:  General, LMA    Note:  Anesthesia Post Evaluation    Patient location during evaluation: PACU  Patient participation: Able to fully participate in evaluation  Level of consciousness: awake and alert  Pain management: adequate  Airway patency: patent  Cardiovascular status: acceptable  Respiratory status: acceptable  Hydration status: acceptable  PONV: none     Anesthetic complications: None          Last vitals:  Vitals:    12/22/17 0945 12/22/17 1000 12/22/17 1051   BP: 124/62 109/81 125/59   Resp: 15 12 16   Temp: 36  C (96.8  F) 35.8  C (96.4  F)    SpO2: 93% 92% 96%         Electronically Signed By: Jensen Gilman MD  December 22, 2017  5:42 PM

## 2017-12-23 NOTE — OP NOTE
DATE OF PROCEDURE:  12/22/2017      PREOPERATIVE DIAGNOSIS:  Left ureteral calculus.      POSTOPERATIVE DIAGNOSIS:  Left ureteral calculus.      PROCEDURE:  Cystoscopy, left ureteral stent removal, left ureteroscopy with stone removal.      SURGEON:  Rosalino Mejia MD      ANESTHESIA:  General.      ESTIMATED BLOOD LOSS:  Zero.      SPECIMENS:  Stone sent for analysis.      COMPLICATIONS:  None.      INDICATIONS:  Kaitlyn Yao is a 72-year-old female who usually sees Dr. Moeller.  She has history of a staghorn calculus over 10 years ago.  Has had trouble with chronic bladder infections.  She was admitted to hospital on 11/30/2017 with left flank pain and fever.  A CT scan was performed which showed a 3 mm stone in the proximal left ureter.  No stones in either kidney.  She had a stent placed on 11/30/2017.  Urine culture grew out Enterobacter.  She completed a course of antibiotics and presents now to undergo treatment for her left ureteral stone.      DESCRIPTION OF PROCEDURE:  The patient was brought to the operating room, placed in a supine position.  After undergoing general anesthetic, she was placed in a low lithotomy position.  Her genitalia was prepped and draped in the usual sterile fashion.  A 22-Ukrainian rigid cystoscope inserted in the bladder.  There were no urethral lesions or strictures.  The stent could be seen from the left orifice.  A 2-prong grasper was used to grab the stent.  This was brought to the urethral opening.  A 0.035 Sensor wire was then passed up the stent into the left kidney under fluoroscopic guidance.  The stent was removed.  Cystoscope was removed.      Next, a rigid ureteroscope was advanced up the left ureter under direct vision.  A 3 mm stone was seen roughly 2/3 way up the ureter.  The Escape basket was used to grab the stone and extract it intact.  The entire left ureter was then visualized.  No further stones were seen.  The ureter was widely patent.  Decision was made to not  replace it at this time.  The ureteroscope was removed.  The guidewire was removed.  The ureteral orifices were observed.  Urine could be seen effluxing from the left ureteral orifice.  The patient's bladder was emptied and cystoscope was removed.  She was put back in a supine position, awoken from anesthesia and transferred back recovery room in good condition.  There were no complications.        Recommend the patient follow up in 2-4 weeks with a repeat urinalysis and culture.  Consider checking a 24-hour urine test to assess risk factors for stone formation in the future.         BRETT REYES MD             D: 2017 09:09   T: 2017 20:56   MT: [id number]      Name:     IGGY HOYT   MRN:      [id number]        Account:        [id number]   :      1945           Procedure Date: 2017      Document: [id number]       cc: Jason Salter MD       [facility]

## 2017-12-27 LAB
APPEARANCE STONE: NORMAL
COMPN STONE: NORMAL
COPATH REPORT: NORMAL
NUMBER STONE: 1
SIZE STONE: NORMAL MM
WT STONE: 22 MG

## 2018-01-29 NOTE — BRIEF OP NOTE
[facility] Brief Operative Note    Pre-operative diagnosis: URETERAL STONE, LEFT    Post-operative diagnosis Left ureteral stone      Procedure: Procedure(s):  CYSTOSCOPY, LEFT URETEROSCOPY WITH HOLMIUM LASER LITHOTRIPSY, LEFT STENT EXCHANGE  - Wound Class: II-Clean Contaminated   - Wound Class: II-Clean Contaminated   Surgeon(s): Surgeon(s) and Role:     * Rosalino Mejia MD - Primary   Estimated blood loss: * No values recorded between 12/22/2017  8:45 AM and 12/22/2017  9:01 AM *    Specimens:   ID Type Source Tests Collected by Time Destination   1 : LEFT URETERAL STONE Calculus/Stone Ureter, Left STONE ANALYSIS Rosalino Mejia MD 12/22/2017  8:54 AM       Findings: 3 mm stone in Left proximal ureter      None known

## 2018-10-31 ENCOUNTER — OFFICE VISIT (OUTPATIENT)
Dept: URGENT CARE | Facility: URGENT CARE | Age: 73
End: 2018-10-31
Payer: MEDICARE

## 2018-10-31 VITALS
BODY MASS INDEX: 32.92 KG/M2 | RESPIRATION RATE: 17 BRPM | DIASTOLIC BLOOD PRESSURE: 66 MMHG | WEIGHT: 180 LBS | TEMPERATURE: 97.1 F | HEART RATE: 95 BPM | OXYGEN SATURATION: 95 % | SYSTOLIC BLOOD PRESSURE: 133 MMHG

## 2018-10-31 DIAGNOSIS — J01.90 ACUTE SINUSITIS WITH SYMPTOMS > 10 DAYS: Primary | ICD-10-CM

## 2018-10-31 DIAGNOSIS — T88.7XXA MEDICATION SIDE EFFECTS: ICD-10-CM

## 2018-10-31 PROCEDURE — 99213 OFFICE O/P EST LOW 20 MIN: CPT | Performed by: FAMILY MEDICINE

## 2018-10-31 RX ORDER — GLIMEPIRIDE 1 MG/1
1 TABLET ORAL
COMMUNITY

## 2018-10-31 RX ORDER — FLUCONAZOLE 150 MG/1
150 TABLET ORAL ONCE
Qty: 1 TABLET | Refills: 0 | Status: SHIPPED | OUTPATIENT
Start: 2018-10-31 | End: 2018-10-31

## 2018-10-31 RX ORDER — AMOXICILLIN 875 MG
875 TABLET ORAL 2 TIMES DAILY
Qty: 20 TABLET | Refills: 0 | Status: SHIPPED | OUTPATIENT
Start: 2018-10-31 | End: 2018-11-10

## 2018-10-31 NOTE — PROGRESS NOTES
SUBJECTIVE: Kaitlyn Yao is a 73 year old female here with concerns about sinus infection.  She states onset  of symptoms were greater than 10 days with sinus pressure and drainage.  Course of illness is worsening.    Severity: moderate  Current and Associated symptoms: cold symptoms  Predisposing factors include none.   Recent treatment has included: OTC's  Allergic symptoms include: none    Past Medical History:   Diagnosis Date     Diabetes mellitus (H)     type 2     Hearing loss      Hyperlipemia      Hyperlipidemia      Hypertension      Meniere's disease      Noninfectious ileitis     IBS     Pyelonephritis      Renal disease     KIDNEY STONES     Rosacea      Rosacea      Sciatica of right side      Urge incontinence      Urinary, incontinence, stress female      Allergies   Allergen Reactions     Chocolate Other (See Comments)     migraines     Meloxicam Itching     Social History   Substance Use Topics     Smoking status: Former Smoker     Quit date: 8/17/1977     Smokeless tobacco: Never Used     Alcohol use No       ROS:   no rash  no vomiting    OBJECTIVE:  /66  Pulse 95  Temp 97.1  F (36.2  C) (Oral)  Resp 17  Wt 180 lb (81.6 kg)  SpO2 95%  Breastfeeding? No  BMI 32.92 kg/m2  NAD  EYES: clear, no mattering  EARS: TM's clear, no redness/buldging, canals clear, no swelling/redness  NOSE: discolored discharge brenda. Nares  THROAT: clear, no erythema, no exudate  SINUS: maxillary tenderness with palpation  LUNGS: CTAB, no rhonchi/wheeze/rales      ICD-10-CM    1. Acute sinusitis with symptoms > 10 days J01.90 amoxicillin (AMOXIL) 875 MG tablet   2. Medication side effects T88.7XXA fluconazole (DIFLUCAN) 150 MG tablet       Follow up with primary clinic if not improving

## 2018-10-31 NOTE — MR AVS SNAPSHOT
"              After Visit Summary   10/31/2018    Kaitlyn Yao    MRN: [id number]           Patient Information     Date Of Birth          1945        Visit Information        Provider Department      10/31/2018 9:30 AM Rl Villalobos,  [facility]        Today's Diagnoses     Acute sinusitis with symptoms > 10 days    -  1    Medication side effects           Follow-ups after your visit        Who to contact     If you have questions or need follow up information about today's clinic visit or your schedule please contact [facility] directly at [phone].  Normal or non-critical lab and imaging results will be communicated to you by MyChart, letter or phone within 4 business days after the clinic has received the results. If you do not hear from us within 7 days, please contact the clinic through [employer] or phone. If you have a critical or abnormal lab result, we will notify you by phone as soon as possible.  Submit refill requests through [employer] or call your pharmacy and they will forward the refill request to us. Please allow 3 business days for your refill to be completed.          Additional Information About Your Visit        MyChart Information     [employer] lets you send messages to your doctor, view your test results, renew your prescriptions, schedule appointments and more. To sign up, go to [email or web address] . Click on \"Log in\" on the left side of the screen, which will take you to the Welcome page. Then click on \"Sign up Now\" on the right side of the page.     You will be asked to enter the access code listed below, as well as some personal information. Please follow the directions to create your username and password.     Your access code is: [email or web address]  Expires: 2019  9:50 AM     Your access code will  in 90 days. If you need help or a new code, please call your [facility] clinic or [phone].        Care " EveryWhere ID     This is your Care EveryWhere ID. This could be used by other organizations to access your [place] medical records  [id number]        Your Vitals Were     Pulse Temperature Respirations Pulse Oximetry Breastfeeding? BMI (Body Mass Index)    95 97.1  F (36.2  C) (Oral) 17 95% No 32.92 kg/m2       Blood Pressure from Last 3 Encounters:   10/31/18 133/66   12/22/17 125/59   12/02/17 136/60    Weight from Last 3 Encounters:   10/31/18 180 lb (81.6 kg)   12/01/17 169 lb (76.7 kg)   01/17/17 175 lb (79.4 kg)              Today, you had the following     No orders found for display         Today's Medication Changes          These changes are accurate as of 10/31/18  9:50 AM.  If you have any questions, ask your nurse or doctor.               Start taking these medicines.        Dose/Directions    amoxicillin 875 MG tablet   Commonly known as:  AMOXIL   Used for:  Acute sinusitis with symptoms > 10 days   Started by:  Rl Villalobos DO        Dose:  875 mg   Take 1 tablet (875 mg) by mouth 2 times daily for 10 days   Quantity:  20 tablet   Refills:  0         These medicines have changed or have updated prescriptions.        Dose/Directions    fluconazole 150 MG tablet   Commonly known as:  DIFLUCAN   This may have changed:    - when to take this  - reasons to take this   Used for:  Medication side effects   Changed by:  Rl Villalobos DO        Dose:  150 mg   Take 1 tablet (150 mg) by mouth once for 1 dose   Quantity:  1 tablet   Refills:  0         Stop taking these medicines if you haven't already. Please contact your care team if you have questions.     HYDROcodone-acetaminophen 5-325 MG per tablet   Commonly known as:  NORCO   Stopped by:  Rl Villalobos DO           [facility] PO   Stopped by:  Rl Villalobos DO                Where to get your medicines      These medications were sent to [id number] - [place], [place] - [address]  [address],  [facility] [address]     Phone:  [phone]     amoxicillin 875 MG tablet    fluconazole 150 MG tablet                Primary Care Provider Office Phone # Fax #    Jason Salter [phone]       [facility] [address]  [facility] [address]        Equal Access to Services     RODERICK DEWITT : Hadii aad ku hadasho Soomaali, waaxda luqadaha, qaybta kaalmada adeanushkada, jeannette prado karlostoney gimenezreinasuman wilkins. So [facility] [phone].    ATENCIÓN: Si habla español, tiene a arora disposición servicios gratuitos de asistencia lingüística. [facility] [phone].    We comply with applicable federal civil rights laws and [place] laws. We do not discriminate on the basis of race, color, national origin, age, disability, sex, sexual orientation, or gender identity.            Thank you!     Thank you for choosing [facility]  for your care. Our goal is always to provide you with excellent care. Hearing back from our patients is one way we can continue to improve our services. Please take a few minutes to complete the written survey that you may receive in the mail after your visit with us. Thank you!             Your Updated Medication List - Protect others around you: Learn how to safely use, store and throw away your medicines at www.disposemymeds.org.          This list is accurate as of 10/31/18  9:50 AM.  Always use your most recent med list.                   Brand Name Dispense Instructions for use Diagnosis    AMARYL 1 MG tablet   Generic drug:  glimepiride      Take 1 mg by mouth every morning (before breakfast)        amoxicillin 875 MG tablet    AMOXIL    20 tablet    Take 1 tablet (875 mg) by mouth 2 times daily for 10 days    Acute sinusitis with symptoms > 10 days       ASPIRIN PO      Take 81 mg by mouth daily        BIOTIN PO      Take 2,500 mcg by mouth 2 times daily        BYDUREON 2 MG recon susp for weekly inj   Generic drug:  exenatide ER      Inject 2  mg Subcutaneous once a week        COQ-10 PO      Take 300 mg by mouth daily        fluconazole 150 MG tablet    DIFLUCAN    1 tablet    Take 1 tablet (150 mg) by mouth once for 1 dose    Medication side effects       irbesartan 300 MG tablet    AVAPRO     Take 300 mg by mouth daily        METROLOTION 0.75 % Lotn   Generic drug:  metroNIDAZOLE      Externally apply topically 2 times daily as needed        nystatin-triamcinolone cream    MYCOLOG II     Apply topically 2 times daily        PRAVACHOL PO      Take 20 mg by mouth daily.        PROBIOTIC ACIDOPHILUS PO      Take 1 capsule by mouth daily        sertraline 50 MG tablet    ZOLOFT     Take 50 mg by mouth daily        triamcinolone 0.1 % cream    KENALOG     Apply topically 3 times daily

## 2019-07-26 ENCOUNTER — HOSPITAL ENCOUNTER (OUTPATIENT)
Dept: MAMMOGRAPHY | Facility: CLINIC | Age: 74
Discharge: HOME | End: 2019-07-26
Attending: FAMILY MEDICINE | Admitting: FAMILY MEDICINE
Payer: MEDICARE

## 2019-07-26 ENCOUNTER — HOSPITAL ENCOUNTER (OUTPATIENT)
Dept: MAMMOGRAPHY | Facility: CLINIC | Age: 74
End: 2019-07-26
Attending: FAMILY MEDICINE
Payer: MEDICARE

## 2019-07-26 DIAGNOSIS — R92.8 ABNORMAL MAMMOGRAM: ICD-10-CM

## 2019-07-26 PROCEDURE — 76642 ULTRASOUND BREAST LIMITED: CPT | Mod: LT

## 2019-07-26 PROCEDURE — G0279 TOMOSYNTHESIS, MAMMO: HCPCS

## 2019-07-26 PROCEDURE — 77066 DX MAMMO INCL CAD BI: CPT

## 2019-08-01 ENCOUNTER — HOSPITAL ENCOUNTER (OUTPATIENT)
Dept: MRI IMAGING | Facility: CLINIC | Age: 74
Discharge: HOME | End: 2019-08-01
Attending: FAMILY MEDICINE | Admitting: FAMILY MEDICINE
Payer: MEDICARE

## 2019-08-01 DIAGNOSIS — R92.8 ABNORMAL MAMMOGRAM: ICD-10-CM

## 2019-08-01 LAB
CREAT BLD-MCNC: 0.6 MG/DL (ref 0.52–1.04)
GFR SERPL CREATININE-BSD FRML MDRD: >90 ML/MIN/{1.73_M2}

## 2019-08-01 PROCEDURE — 25500064 ZZH RX 255 OP 636

## 2019-08-01 PROCEDURE — 77049 MRI BREAST C-+ W/CAD BI: CPT

## 2019-08-01 PROCEDURE — A9585 GADOBUTROL INJECTION: HCPCS

## 2019-08-01 PROCEDURE — 25800025 ZZH RX 258

## 2019-08-01 PROCEDURE — 82565 ASSAY OF CREATININE: CPT

## 2019-08-01 RX ORDER — GADOBUTROL 604.72 MG/ML
8 INJECTION INTRAVENOUS ONCE
Status: COMPLETED | OUTPATIENT
Start: 2019-08-01 | End: 2019-08-01

## 2019-08-01 RX ORDER — ACYCLOVIR 200 MG/1
60 CAPSULE ORAL ONCE
Status: COMPLETED | OUTPATIENT
Start: 2019-08-01 | End: 2019-08-01

## 2019-08-01 RX ADMIN — GADOBUTROL 8 ML: 604.72 INJECTION INTRAVENOUS at 19:55

## 2019-08-01 RX ADMIN — SODIUM CHLORIDE 30 ML: 9 INJECTION, SOLUTION INTRAMUSCULAR; INTRAVENOUS; SUBCUTANEOUS at 19:55

## 2019-08-05 NOTE — PROGRESS NOTES
[facility] Breast Surgery Consultation    HPI:   Kaitlyn Yao is a 74 year old female who is seen in consultation at the request of Dr. Salter for evaluation of left breast bloody nipple discharge. She had a diagnostic mammogram and US on 7/26/2019 which revealed possible subtle focus of architectural distortion in the retroareolar left breast on 3D mammogram and a few mildly dilated ducts on ultrasound with a small simple cyst at 2:00, 3cm FN. She then had a breast MRI which revealed an enhancing 8mm mass with irregular margins in the left subareolar breast at 1:00, 2cm FN and MRI guided biopsy was recommended.  She reports no prior breast concerns. No prior biopsies or surgeries on her breasts. No breast pain.     Hormonal history:   menarche 12,  5 children, 1st at age 24, post-menopausal, 2 OCP use, no HRT, no fertility treatment.     Family history of breast cancer: No  Family history of ovarian cancer:  No  Family history of colon cancer: No  Family history of prostate cancer: No    Imaging:     Recent Results (from the past 744 hour(s))   MA Diagnostic Bilateral w/Vikash    Narrative    MA DIAGNOSTIC BILATERAL WITH VIKASH, US BREAST LEFT LIMITED 1-3  QUADRANTS- 7/26/2019 9:14 AM      HISTORY:  Left bloody nipple discharge. This occurred spontaneously  once in the last week.     COMPARISON:  7/9/2018, 6/6/2017, 5/17/2016     BREAST DENSITY: Heterogeneously dense.    FINDINGS:  A bilateral mammogram with tomosynthesis was obtained.  Coarse calcifications in the upper outer right breast are unchanged.  Possible subtle focus of architectural distortion in the retroareolar  left breast which is only seen on the tomosynthesis CC views.    Ultrasound of the retroareolar left breast was performed. There are a  few mildly dilated ducts. No intraductal masses are seen. There is a  small simple cyst at the 2 o'clock position 3 cm from the nipple. No  suspicious sonographic finding is seen.       Impression     IMPRESSION: BI-RADS CATEGORY: 0 - Incomplete - Need Additional Imaging  Evaluation and/or Prior Mammograms for Comparison    RECOMMENDED FOLLOW-UP: Surgical consultation.  Surgical consultation and breast MRI are recommended for further  evaluation of bloody nipple discharge. The possible architectural  distortion noted on tomosynthesis can be further evaluated with breast  MRI.    PHI PINOT MD   US Breast Left    Narrative    MA DIAGNOSTIC BILATERAL WITH VIKASH, US BREAST LEFT LIMITED 1-3  QUADRANTS- 7/26/2019 9:14 AM      HISTORY:  Left bloody nipple discharge. This occurred spontaneously  once in the last week.     COMPARISON:  7/9/2018, 6/6/2017, 5/17/2016     BREAST DENSITY: Heterogeneously dense.    FINDINGS:  A bilateral mammogram with tomosynthesis was obtained.  Coarse calcifications in the upper outer right breast are unchanged.  Possible subtle focus of architectural distortion in the retroareolar  left breast which is only seen on the tomosynthesis CC views.    Ultrasound of the retroareolar left breast was performed. There are a  few mildly dilated ducts. No intraductal masses are seen. There is a  small simple cyst at the 2 o'clock position 3 cm from the nipple. No  suspicious sonographic finding is seen.       Impression    IMPRESSION: BI-RADS CATEGORY: 0 - Incomplete - Need Additional Imaging  Evaluation and/or Prior Mammograms for Comparison    RECOMMENDED FOLLOW-UP: Surgical consultation.  Surgical consultation and breast MRI are recommended for further  evaluation of bloody nipple discharge. The possible architectural  distortion noted on tomosynthesis can be further evaluated with breast  MRI.    PHI PINTO MD   MR Breast Bilateral w/o & w Contrast    Narrative    MRI BREASTS, BILATERAL, ENHANCED - 8/1/2019 6:45 PM    HISTORY: Left bloody nipple discharge.     COMPARISON: Mammograms and ultrasound on July 26, 2019.     TECHNIQUE: Both breasts were simultaneously evaluated using  dedicated  breast coil.  Axial STIR, axial T1 before and at two-minute intervals  out to eight minutes following 10 ml Gadavist administration and  sagittal postcontrast images were performed, as well as subtraction,  CAD and angio mapping.    FINDINGS:     Right Breast: There is moderate  background parenchymal enhancement.    There are no areas of suspicious enhancement or lymphadenopathy.    Left Breast: There is moderate  background parenchymal enhancement.    Although evaluation is limited by moderate background parenchymal  enhancement, there appears to be an enhancing 8 mm mass with irregular  margins in the left subareolar breast at approximately 1:00 2 cm from  the nipple. This is best appreciated on postcontrast axial views  series 5 image 71.    No suspicious lymphadenopathy.       Impression    IMPRESSION:   BI-RADS 4, SUSPICIOUS ABNORMALITY,  LEFT . Subcentimeter left  subareolar breast mass, as above.    RECOMMENDED FOLLOW-UP:  Histology using MRI guided core needle biopsy with clip placement is  recommended.     OSMEL SLAUGHTER MD       Past Medical History:   has a past medical history of Diabetes mellitus (H), Hearing loss, Hyperlipemia, Hyperlipidemia, Hypertension, Meniere's disease, Noninfectious ileitis, Pyelonephritis, Renal disease, Rosacea, Rosacea, Sciatica of right side, Urge incontinence, and Urinary, incontinence, stress female.      Current Outpatient Medications:      ASPIRIN PO, Take 81 mg by mouth daily, Disp: , Rfl:      BIOTIN PO, Take 2,500 mcg by mouth 2 times daily , Disp: , Rfl:      Coenzyme Q10 (COQ-10 PO), Take 300 mg by mouth daily , Disp: , Rfl:      exenatide (BYDUREON) 2 MG SUSR, Inject 2 mg Subcutaneous once a week, Disp: , Rfl:      glimepiride (AMARYL) 1 MG tablet, Take 1 mg by mouth every morning (before breakfast), Disp: , Rfl:      irbesartan (AVAPRO) 300 MG tablet, Take 300 mg by mouth daily , Disp: , Rfl:      Lactobacillus (PROBIOTIC ACIDOPHILUS PO), Take 1 capsule  by mouth daily, Disp: , Rfl:      metroNIDAZOLE (METROLOTION) 0.75 % LOTN, Externally apply topically 2 times daily as needed , Disp: , Rfl:      nystatin-triamcinolone (MYCOLOG II) cream, Apply topically 2 times daily, Disp: , Rfl:      Pravastatin Sodium (PRAVACHOL PO), Take 20 mg by mouth daily., Disp: , Rfl:      sertraline (ZOLOFT) 50 MG tablet, Take 50 mg by mouth daily, Disp: , Rfl:      triamcinolone (KENALOG) 0.1 % cream, Apply topically 3 times daily, Disp: , Rfl:     Past Surgical History:  Past Surgical History:   Procedure Laterality Date     CHOLECYSTECTOMY       COMBINED CYSTOSCOPY, INSERT STENT URETER(S) Left 11/30/2017    Procedure: COMBINED CYSTOSCOPY, INSERT STENT URETER(S);  CYSTOSCOPY, LEFT URETERAL STENT PLACEMENT;  Surgeon: Rosalino Mejia MD;  Location:  OR     CYSTOSCOPY  1/11/2013    Procedure: CYSTOSCOPY;;  Surgeon: Matthew Ramírez MD;  Location:  OR     CYSTOSCOPY BLADDER WITH STONE EXTRACTION  12/22/2017    Procedure: CYSTOSCOPY BLADDER WITH STONE EXTRACTION;;  Surgeon: Rosalino Mejia MD;  Location:  OR     CYSTOSCOPY, URETEROSCOPY, INSERT STENT Left 12/22/2017    Procedure: CYSTOSCOPY, URETEROSCOPY, INSERT STENT;  CYSTOSCOPY, LEFT URETEROSCOPY, LEFT URETERAL STENT REMOVAL, AND STONE EXTRACTION;  Surgeon: Rosalino Mejia MD;  Location:  OR     DAVINCI HYSTERECTOMY SUPRACERVICAL, SACROCOLPOPEXY, COMBINED  1/11/2013    Procedure: COMBINED DAVINCI HYSTERECTOMY SUPRACERVICAL, SACROCOLPOPEXY;  DAVINCI SUPRACERVICAL HYSTERECTOMY, BILATERAL SALPINGO OOPHORECTOMY,SACROCOLPOPEXY, PERINEORRHAPHY, BLADDER SLING (EXACT),  AND CYSTOSCOPY ;  Surgeon: Matthew Ramírez MD;  Location:  OR     ENHANCE ENDOLYMPHATIC SAC, DEC SIGMOID SINUS, EXTND FACIAL RECESS APPRCH, MASTOIDECTOMY, BMT, COMBO Left 11/12/2015    Procedure: COMBINED ENHANCE ENDOLYMPHATIC SAC, DECOMPRESS SIGMOID SINUS, EXTENDED FACIAL RECESS APPROACH, COMPLETE MASTOIDECTOMY, MYRINGOTOMY, INSERT TUBE;  Surgeon:  Dilan Azevedo MD;  Location: [facility] OR     ENT SURGERY      T&A     GENITOURINARY SURGERY      open pyelolithotomy for kidney stone removal......stenting at right kidney     ORTHOPEDIC SURGERY      Achilles tendon repair     PERINEORRHAPHY  2013    Procedure: PERINEORRHAPHY;;  Surgeon: Matthew Ramírez MD;  Location: [facility] OR     SLING TRANSVAGINAL  2013    Procedure: SLING TRANSVAGINAL;;  Surgeon: Matthew Ramírez MD;  Location: [facility] OR           Allergies   Allergen Reactions     Chocolate Other (See Comments)     migraines     Meloxicam Itching         Social History:  Social History     Socioeconomic History     Marital status:      Spouse name: Not on file     Number of children: Not on file     Years of education: Not on file     Highest education level: Not on file   Occupational History     Not on file   Social Needs     Financial resource strain: Not on file     Food insecurity:     Worry: Not on file     Inability: Not on file     Transportation needs:     Medical: Not on file     Non-medical: Not on file   Tobacco Use     Smoking status: Former Smoker     Last attempt to quit: 1977     Years since quittin.9     Smokeless tobacco: Never Used   Substance and Sexual Activity     Alcohol use: No     Drug use: No     Sexual activity: Not on file   Lifestyle     Physical activity:     Days per week: Not on file     Minutes per session: Not on file     Stress: Not on file   Relationships     Social connections:     Talks on phone: Not on file     Gets together: Not on file     Attends Christian service: Not on file     Active member of club or organization: Not on file     Attends meetings of clubs or organizations: Not on file     Relationship status: Not on file     Intimate partner violence:     Fear of current or ex partner: Not on file     Emotionally abused: Not on file     Physically abused: Not on file     Forced sexual activity: Not on file   Other Topics Concern     Not on  file   Social History Narrative     Not on file        ROS:  The 10 point review of systems is negative other than noted in the HPI and above.    PE:  Vitals: /64   Pulse 77   Wt 74.8 kg (165 lb)   BMI 30.18 kg/m    General appearance: well-nourished, sitting comfortably, no apparent distress  Psych: normal affect, pleasant  HEENT:  Head normocephalic and atraumatic, pupils equal and round, conjunctivae clear, mucous membranes moist, external ears and nose normal  Neck: Supple without thyromegaly or masses  Lungs: Respirations unlabored  Lymphatic: No cervical, or supraclavicular lymphadenopathy  Extremities: Without edema  Musculoskeletal:  Normal station and gait  Neurologic: nonfocal, grossly intact times four extremities, alert and oriented times three  Psychiatric: Mood and affect are appropriate  Skin: Without lesions or rashes    Breast:  A bilateral breast exam was performed in the supine position.. Bilateral breasts were palpated in a circumferential clockwise fashion including the supraclavicular and axillary areas.   Left breast with expressible dark brown drainage from 12:00 duct on nipple, yellowish drainage from several other ducts bilaterally. Breasts are otherwise symmetrical with no skin or nipple changes.     Contour is normal.   Parenchyma is mildly dense.   There is a small palpable lump on the left breast at 12-1:00, edges are not well defined as surrounding by increased density.     Lymph:       No supraclavicular/infraclavicular adenopathy.   Axillary adenopathy: none    Assessment/Plan: Kaitlyn Yao is a 74 year old who presents with left breast bloody nipple discharge with an 8mm enhancing mass noted on breast MRI. We discussed options. I would recommend needle biopsy with MR guidance to establish a diagnosis and if negative then pursue retroareolar ductal excision of the area at 1:00, 2cm FN. She is in agreement with this plan. I will call her with biopsy results when  available. Ativan given for anxiety.        Time spent with the patient with greater that 50% of the time in discussion was 30 minutes.    Jie Hubbard MD      Please route or send letter to:  Primary Care Provider (PCP) and Referring Provider

## 2019-08-06 ENCOUNTER — OFFICE VISIT (OUTPATIENT)
Dept: SURGERY | Facility: CLINIC | Age: 74
End: 2019-08-06
Payer: MEDICARE

## 2019-08-06 VITALS
HEART RATE: 77 BPM | DIASTOLIC BLOOD PRESSURE: 64 MMHG | BODY MASS INDEX: 30.18 KG/M2 | WEIGHT: 165 LBS | SYSTOLIC BLOOD PRESSURE: 116 MMHG

## 2019-08-06 DIAGNOSIS — N63.20 LEFT BREAST MASS: Primary | ICD-10-CM

## 2019-08-06 PROCEDURE — 99203 OFFICE O/P NEW LOW 30 MIN: CPT | Performed by: SURGERY

## 2019-08-06 RX ORDER — LORAZEPAM 1 MG/1
1 TABLET ORAL
Qty: 2 TABLET | Refills: 0 | Status: SHIPPED | OUTPATIENT
Start: 2019-08-06 | End: 2019-08-06

## 2019-08-06 NOTE — PATIENT INSTRUCTIONS
You are scheduled for MRI guided biopsy at [facility] on 8/9/19 at 12:30pm   * Check-in at [facility] at 11:30am   * Eat prior to procedure   * Wear or bring a bra    Please contact G[facility] at [facility] to schedule your surgery at your convenience.

## 2019-08-06 NOTE — NURSING NOTE
Breast Patients    BREAST PATIENTS (ALL)    1-Do you have any of the following symptoms? Nipple Discharge ( bloody)  2-In which breast are you having the symptoms? left  3-Have you had a Mammogram? Yes  Where: [facility]  Date: 7/26/19  4-Have you ever had a breast cyst drained? No  5-Have you ever had a breast biopsy? No  6-Have you ever had a Breast Cancer? No   7-Is there a history of Breast Cancer in your family? No  8-Have you ever had Ovarian Cancer? No  9-Is there a history of Ovarian Cancer in your family? No  10-Summarize your caffeine intake (i.e. coffee, tea, chocolate, soda etc.): ?    BREAST PATIENTS (FEMALE)    11-What age did your periods begin? 12  12-Date your last menstrual period began? ?  13-Number of full-term pregnancies: 5  14-Your age when your first child was born? 24  15-Did you nurse your children? Yes, 1 child   16-Are you pregnant now? No  17-Have you begun menopause? Yes  Age Menopause began:  50?  18-Have you had either ovary removed?Yes  Date of Surgery:  2011 19-Do you have breast implants? No   20-Do you use hormone replacement therapy?  Yes has in the past    21-Have you taken oral contraceptive pills?  Yes, For how many years?  2  22-Have you had an intrauterine device (IUD) placed?  Yes, For how many years?  ?  23-What is your current bra size?  42B    Phyllis Gonzalez MA

## 2019-08-08 ENCOUNTER — TELEPHONE (OUTPATIENT)
Dept: MAMMOGRAPHY | Facility: CLINIC | Age: 74
End: 2019-08-08

## 2019-08-08 RX ORDER — LORAZEPAM 1 MG/1
0.5 TABLET ORAL
Qty: 2 TABLET | Refills: 0 | Status: ON HOLD
Start: 2019-08-08 | End: 2020-02-19

## 2019-08-08 NOTE — TELEPHONE ENCOUNTER
Ms. Yao was called and given instructions related to upcoming Left Breast MRI guided Biopsy.  Kaitlyn states understanding of date, time, location and procedure information.

## 2019-08-09 ENCOUNTER — HOSPITAL ENCOUNTER (OUTPATIENT)
Dept: MAMMOGRAPHY | Facility: CLINIC | Age: 74
End: 2019-08-09
Attending: SURGERY
Payer: MEDICARE

## 2019-08-09 ENCOUNTER — HOSPITAL ENCOUNTER (OUTPATIENT)
Dept: MRI IMAGING | Facility: CLINIC | Age: 74
Discharge: HOME | End: 2019-08-09
Attending: SURGERY | Admitting: SURGERY
Payer: MEDICARE

## 2019-08-09 DIAGNOSIS — N63.20 LEFT BREAST MASS: ICD-10-CM

## 2019-08-09 PROCEDURE — 88342 IMHCHEM/IMCYTCHM 1ST ANTB: CPT | Performed by: RADIOLOGY

## 2019-08-09 PROCEDURE — 88341 IMHCHEM/IMCYTCHM EA ADD ANTB: CPT | Performed by: RADIOLOGY

## 2019-08-09 PROCEDURE — 88341 IMHCHEM/IMCYTCHM EA ADD ANTB: CPT | Mod: 26,XU | Performed by: RADIOLOGY

## 2019-08-09 PROCEDURE — 25500064 ZZH RX 255 OP 636: Performed by: SURGERY

## 2019-08-09 PROCEDURE — A9585 GADOBUTROL INJECTION: HCPCS | Performed by: SURGERY

## 2019-08-09 PROCEDURE — 27210135 MR BREAST PRCTNUS CORE NDL BX 1ST LSN RT OR LT

## 2019-08-09 PROCEDURE — 40000986 MA POST PROCEDURE LEFT

## 2019-08-09 PROCEDURE — 25000125 ZZHC RX 250: Performed by: SURGERY

## 2019-08-09 PROCEDURE — 88360 TUMOR IMMUNOHISTOCHEM/MANUAL: CPT | Performed by: RADIOLOGY

## 2019-08-09 PROCEDURE — 88305 TISSUE EXAM BY PATHOLOGIST: CPT | Performed by: RADIOLOGY

## 2019-08-09 PROCEDURE — 88360 TUMOR IMMUNOHISTOCHEM/MANUAL: CPT | Mod: 26 | Performed by: RADIOLOGY

## 2019-08-09 PROCEDURE — 88305 TISSUE EXAM BY PATHOLOGIST: CPT | Mod: 26 | Performed by: RADIOLOGY

## 2019-08-09 PROCEDURE — 88342 IMHCHEM/IMCYTCHM 1ST ANTB: CPT | Mod: 26,XU | Performed by: RADIOLOGY

## 2019-08-09 RX ORDER — GADOBUTROL 604.72 MG/ML
7 INJECTION INTRAVENOUS ONCE
Status: COMPLETED | OUTPATIENT
Start: 2019-08-09 | End: 2019-08-09

## 2019-08-09 RX ORDER — LIDOCAINE HYDROCHLORIDE 10 MG/ML
10 INJECTION, SOLUTION EPIDURAL; INFILTRATION; INTRACAUDAL; PERINEURAL ONCE
Status: COMPLETED | OUTPATIENT
Start: 2019-08-09 | End: 2019-08-09

## 2019-08-09 RX ADMIN — LIDOCAINE HYDROCHLORIDE 11 ML: 10; .005 INJECTION, SOLUTION EPIDURAL; INFILTRATION; INTRACAUDAL; PERINEURAL at 13:18

## 2019-08-09 RX ADMIN — LIDOCAINE HYDROCHLORIDE 1 ML: 10 INJECTION, SOLUTION EPIDURAL; INFILTRATION; INTRACAUDAL; PERINEURAL at 13:19

## 2019-08-09 RX ADMIN — GADOBUTROL 7 ML: 604.72 INJECTION INTRAVENOUS at 12:08

## 2019-08-09 NOTE — DISCHARGE INSTRUCTIONS
After Your Breast Biopsy    Bleeding or bruising: Slight bruising is normal.  If you bleed through the bandage, put direct pressure on the breast.  If you are still bleeding after 20 minutes, call the doctor who ordered the exam.    Bandages: Keep your bandage in place until tomorrow morning.  Do not get it wet.  Leave the tape in place for two days.  On the second day, cover it with a Band-Aid.    Activity: You may shower the morning after the exam.  No heavy activity (lifting, vacuuming) for 24 hours.    Discomfort: Wear your bra overnight to support the breast.  You may take Tylenol (acetaminophen) for pain.  If you had a stereotactic of MR-directed biopsy, you may take aspirin or ibuprofen (Advil, Motrin) the morning after your biopsy, unless your doctor tells you not to.    Infection: Infection is rare.  Symptoms include fever, redness, increasing pain and fluid draining from the biopsy site.  If you have any of these symptoms, please call the doctor who ordered your exam.    Results: Results may take up to three business days.  If you have not heard your results in three days, call the Breast Center Nurse at [phone] or [phone].  In rare cases, we may need to do another biopsy.    Call the doctor who ordered your exam if:    You have bleeding that lasts more than 20 minutes.    You have pain that cannot be controlled.    You have signs of infection (fever, redness, drainage or other signs).    You have not had your results within three days.    Nurse navigator: Our nurse navigator is here to answer your questions and help you set up future clinic visits.  Please call [phone].    Thank you for choosing [facility].  Please call us if you have questions or concerns about your biopsy.

## 2019-08-12 LAB — COPATH REPORT: NORMAL

## 2019-08-13 ENCOUNTER — TELEPHONE (OUTPATIENT)
Dept: SURGERY | Facility: CLINIC | Age: 74
End: 2019-08-13

## 2019-08-13 ENCOUNTER — TELEPHONE (OUTPATIENT)
Dept: MAMMOGRAPHY | Facility: CLINIC | Age: 74
End: 2019-08-13

## 2019-08-13 DIAGNOSIS — N63.20 LEFT BREAST MASS: Primary | ICD-10-CM

## 2019-08-13 NOTE — TELEPHONE ENCOUNTER
Type of surgery: seed localized left breast excisional biopsy  Location of surgery: [facility] OR  Date and time of surgery: 9/16/19 10:30am  Surgeon: Dr Hubbard   Pre-Op Appt Date: pt to schedule  Post-Op Appt Date: pt to schedule   Packet sent out: Yes  Pre-cert/Authorization completed:  Not Applicable  Date: 8/13/19    MAC anesthesia

## 2019-08-13 NOTE — PROGRESS NOTES
Pathology report reviewed with our breast radiologist Dr. Roman Ling, who confirmed the recent breast imaging is in concordant with pathology results.     I called patient, confirmed her full name, birth date and informed Kaitlyn Hoyt of her Left Breast MRI needle biopsy(8/9/19) results below showing benign papilloma, radial scar.  Recommended follow up is Surgical.     Dr. Davila has already reviewed the biopsy results and asked that we get patient scheduled for a seed localized left breast excisional biopsy.  Patient had a consultation for left bloody nipple discharge with Dr. Davila on 8/6/2019, and discussed the possibility of doing an excisional biopsy.  I sent a message to [facility]- Dr. Davila's surgery scheduler,  and asked if she could assist patient in getting scheduled for surgery.     I informed patient of Dr. Davila's recommendation to proceed in getting patient scheduled for the seed localized left breast excisional biopsy.  We discussed the seed localization procedure, and the surgery.  I advised patient to wear a good supportive bra with front clasps the day of surgery.   I also informed patient that Cdoie, our surgery scheduler will be calling her sometime today to get surgery scheduled.      Patient states no problems with biopsy site. Questions were answered and my phone number given if she has further questions or concerns.  Patient verbalized understanding and agrees with the plan of care.  Yoli Wharton RN, BSN     Yoli Wharton RN BSN  Breast Nurse Care Coordinator  [facility]  [phone]        Patient Name: KAITLYN HOYT   MR#: [id number]   Specimen #: [id number]   Collected: 8/9/2019   Received: 8/9/2019   Reported: 8/12/2019 16:31   Ordering Phy(s): ROMAN LING   Additional Phy(s): ZHANG DAVILA     SPECIMEN(S):   Left MRI-guided breast nodule needle biopsy, 2.5 cm from nipple, size 0.8   cm     FINAL DIAGNOSIS:   Breast, left, retroareolar,  2.5 cm from nipple: MRI guided biopsy:   - Intraductal papilloma with usual ductal hyperplasia   - Radial scar   - Negative for in situ and invasive carcinoma     Electronically signed out by:     Hong Mendes M.D.

## 2019-08-13 NOTE — ADDENDUM NOTE
Encounter addended by: Dianelys Wharton RN on: 8/13/2019 9:58 AM   Actions taken: Visit Navigator Flowsheet section accepted, Sign clinical note

## 2019-08-13 NOTE — TELEPHONE ENCOUNTER
Pathology report reviewed with our breast radiologist Dr. Roman Ling, who confirmed the recent breast imaging is in concordant with pathology results.    I called patient, confirmed her full name, birth date and informed Kaitlyn Hoyt of her Left Breast MRI needle biopsy(8/9/19) results below showing benign papilloma, radial scar.  Recommended follow up is Surgical.    Dr. Davila has already reviewed the biopsy results and asked that we get patient scheduled for a seed localized left breast excisional biopsy.  Patient had a consultation for left bloody nipple discharge with Dr. Davila on 8/6/2019, and discussed the possibility of doing an excisional biopsy.  I sent a message to [facility]- Dr. Davila's surgery scheduler,  and asked if she could assist patient in getting scheduled for surgery.    I informed patient of Dr. Davila's recommendation to proceed in getting patient scheduled for the seed localized left breast excisional biopsy.  We discussed the seed localization procedure, and the surgery.  I advised patient to wear a good supportive bra with front clasps the day of surgery.   I also informed patient that Codie, our surgery scheduler will be calling her sometime today to get surgery scheduled.     Patient states no problems with biopsy site. Questions were answered and my phone number given if she has further questions or concerns.  Patient verbalized understanding and agrees with the plan of care.  Yoli Wharton RN, BSN    Yoli Wharton RN BSN  Breast Nurse Care Coordinator  [facility]  [phone]      Patient Name: KAITLYN HOYT   MR#: [id number]   Specimen #: [id number]   Collected: 8/9/2019   Received: 8/9/2019   Reported: 8/12/2019 16:31   Ordering Phy(s): ROMAN LING   Additional Phy(s): ZHANG DAVILA     SPECIMEN(S):   Left MRI-guided breast nodule needle biopsy, 2.5 cm from nipple, size 0.8   cm     FINAL DIAGNOSIS:   Breast, left, retroareolar, 2.5 cm  from nipple: MRI guided biopsy:   - Intraductal papilloma with usual ductal hyperplasia   - Radial scar   - Negative for in situ and invasive carcinoma     Electronically signed out by:     Hong Mendes M.D.

## 2019-09-16 ENCOUNTER — APPOINTMENT (OUTPATIENT)
Dept: SURGERY | Facility: PHYSICIAN GROUP | Age: 74
End: 2019-09-16
Payer: MEDICARE

## 2019-09-16 ENCOUNTER — HOSPITAL ENCOUNTER (OUTPATIENT)
Facility: CLINIC | Age: 74
Discharge: HOME | End: 2019-09-16
Attending: SURGERY | Admitting: SURGERY
Payer: MEDICARE

## 2019-09-16 ENCOUNTER — HOSPITAL ENCOUNTER (OUTPATIENT)
Dept: MAMMOGRAPHY | Facility: CLINIC | Age: 74
End: 2019-09-16
Attending: SURGERY
Payer: MEDICARE

## 2019-09-16 ENCOUNTER — ANESTHESIA EVENT (OUTPATIENT)
Dept: SURGERY | Facility: CLINIC | Age: 74
End: 2019-09-16
Payer: MEDICARE

## 2019-09-16 ENCOUNTER — ANESTHESIA (OUTPATIENT)
Dept: SURGERY | Facility: CLINIC | Age: 74
End: 2019-09-16
Payer: MEDICARE

## 2019-09-16 VITALS
HEIGHT: 62 IN | HEART RATE: 71 BPM | TEMPERATURE: 98 F | SYSTOLIC BLOOD PRESSURE: 137 MMHG | DIASTOLIC BLOOD PRESSURE: 66 MMHG | OXYGEN SATURATION: 95 % | WEIGHT: 166.4 LBS | RESPIRATION RATE: 16 BRPM | BODY MASS INDEX: 30.62 KG/M2

## 2019-09-16 DIAGNOSIS — G89.18 POSTOPERATIVE PAIN: Primary | ICD-10-CM

## 2019-09-16 DIAGNOSIS — N63.20 LEFT BREAST MASS: ICD-10-CM

## 2019-09-16 LAB
GLUCOSE BLDC GLUCOMTR-MCNC: 107 MG/DL (ref 70–99)
GLUCOSE BLDC GLUCOMTR-MCNC: 134 MG/DL (ref 70–99)

## 2019-09-16 PROCEDURE — 25000128 H RX IP 250 OP 636: Performed by: NURSE ANESTHETIST, CERTIFIED REGISTERED

## 2019-09-16 PROCEDURE — 71000027 ZZH RECOVERY PHASE 2 EACH 15 MINS: Performed by: SURGERY

## 2019-09-16 PROCEDURE — 36000050 ZZH SURGERY LEVEL 2 1ST 30 MIN: Performed by: SURGERY

## 2019-09-16 PROCEDURE — 25000125 ZZHC RX 250: Performed by: SURGERY

## 2019-09-16 PROCEDURE — 19285 PERQ DEV BREAST 1ST US IMAG: CPT | Mod: LT

## 2019-09-16 PROCEDURE — 40000268 MA BREAST SPECIMEN LEFT OR

## 2019-09-16 PROCEDURE — 71000012 ZZH RECOVERY PHASE 1 LEVEL 1 FIRST HR: Performed by: SURGERY

## 2019-09-16 PROCEDURE — 25000128 H RX IP 250 OP 636: Performed by: SURGERY

## 2019-09-16 PROCEDURE — 37000009 ZZH ANESTHESIA TECHNICAL FEE, EACH ADDTL 15 MIN: Performed by: SURGERY

## 2019-09-16 PROCEDURE — 25800030 ZZH RX IP 258 OP 636: Performed by: NURSE ANESTHETIST, CERTIFIED REGISTERED

## 2019-09-16 PROCEDURE — 25000132 ZZH RX MED GY IP 250 OP 250 PS 637: Mod: GY | Performed by: ANESTHESIOLOGY

## 2019-09-16 PROCEDURE — 37000008 ZZH ANESTHESIA TECHNICAL FEE, 1ST 30 MIN: Performed by: SURGERY

## 2019-09-16 PROCEDURE — 19125 EXCISION BREAST LESION: CPT | Performed by: SURGERY

## 2019-09-16 PROCEDURE — 88307 TISSUE EXAM BY PATHOLOGIST: CPT | Mod: 26,76 | Performed by: SURGERY

## 2019-09-16 PROCEDURE — 40000986 MA POST PROCEDURE LEFT

## 2019-09-16 PROCEDURE — 40000170 ZZH STATISTIC PRE-PROCEDURE ASSESSMENT II: Performed by: SURGERY

## 2019-09-16 PROCEDURE — 36000052 ZZH SURGERY LEVEL 2 EA 15 ADDTL MIN: Performed by: SURGERY

## 2019-09-16 PROCEDURE — 82962 GLUCOSE BLOOD TEST: CPT

## 2019-09-16 PROCEDURE — 27210794 ZZH OR GENERAL SUPPLY STERILE: Performed by: SURGERY

## 2019-09-16 PROCEDURE — 88307 TISSUE EXAM BY PATHOLOGIST: CPT | Performed by: SURGERY

## 2019-09-16 RX ORDER — ONDANSETRON 4 MG/1
4 TABLET, ORALLY DISINTEGRATING ORAL EVERY 30 MIN PRN
Status: DISCONTINUED | OUTPATIENT
Start: 2019-09-16 | End: 2019-09-16 | Stop reason: HOSPADM

## 2019-09-16 RX ORDER — MEPERIDINE HYDROCHLORIDE 25 MG/ML
12.5 INJECTION INTRAMUSCULAR; INTRAVENOUS; SUBCUTANEOUS
Status: DISCONTINUED | OUTPATIENT
Start: 2019-09-16 | End: 2019-09-16 | Stop reason: HOSPADM

## 2019-09-16 RX ORDER — SODIUM CHLORIDE, SODIUM LACTATE, POTASSIUM CHLORIDE, CALCIUM CHLORIDE 600; 310; 30; 20 MG/100ML; MG/100ML; MG/100ML; MG/100ML
INJECTION, SOLUTION INTRAVENOUS CONTINUOUS
Status: DISCONTINUED | OUTPATIENT
Start: 2019-09-16 | End: 2019-09-16 | Stop reason: HOSPADM

## 2019-09-16 RX ORDER — NALOXONE HYDROCHLORIDE 0.4 MG/ML
.1-.4 INJECTION, SOLUTION INTRAMUSCULAR; INTRAVENOUS; SUBCUTANEOUS
Status: DISCONTINUED | OUTPATIENT
Start: 2019-09-16 | End: 2019-09-16 | Stop reason: HOSPADM

## 2019-09-16 RX ORDER — FENTANYL CITRATE 0.05 MG/ML
25-50 INJECTION, SOLUTION INTRAMUSCULAR; INTRAVENOUS
Status: DISCONTINUED | OUTPATIENT
Start: 2019-09-16 | End: 2019-09-16 | Stop reason: HOSPADM

## 2019-09-16 RX ORDER — FENTANYL CITRATE 0.05 MG/ML
25-100 INJECTION, SOLUTION INTRAMUSCULAR; INTRAVENOUS
Status: DISCONTINUED | OUTPATIENT
Start: 2019-09-16 | End: 2019-09-16 | Stop reason: HOSPADM

## 2019-09-16 RX ORDER — ONDANSETRON 2 MG/ML
INJECTION INTRAMUSCULAR; INTRAVENOUS PRN
Status: DISCONTINUED | OUTPATIENT
Start: 2019-09-16 | End: 2019-09-16

## 2019-09-16 RX ORDER — DIAZEPAM 10 MG/2ML
2.5 INJECTION, SOLUTION INTRAMUSCULAR; INTRAVENOUS
Status: DISCONTINUED | OUTPATIENT
Start: 2019-09-16 | End: 2019-09-16 | Stop reason: HOSPADM

## 2019-09-16 RX ORDER — ONDANSETRON 2 MG/ML
4 INJECTION INTRAMUSCULAR; INTRAVENOUS EVERY 30 MIN PRN
Status: DISCONTINUED | OUTPATIENT
Start: 2019-09-16 | End: 2019-09-16 | Stop reason: HOSPADM

## 2019-09-16 RX ORDER — PROPOFOL 10 MG/ML
INJECTION, EMULSION INTRAVENOUS PRN
Status: DISCONTINUED | OUTPATIENT
Start: 2019-09-16 | End: 2019-09-16

## 2019-09-16 RX ORDER — ACETAMINOPHEN 650 MG/1
650 SUPPOSITORY RECTAL EVERY 4 HOURS PRN
Status: DISCONTINUED | OUTPATIENT
Start: 2019-09-16 | End: 2019-09-16 | Stop reason: HOSPADM

## 2019-09-16 RX ORDER — CEFAZOLIN SODIUM 1 G/3ML
1 INJECTION, POWDER, FOR SOLUTION INTRAMUSCULAR; INTRAVENOUS SEE ADMIN INSTRUCTIONS
Status: DISCONTINUED | OUTPATIENT
Start: 2019-09-16 | End: 2019-09-16 | Stop reason: HOSPADM

## 2019-09-16 RX ORDER — PROPOFOL 10 MG/ML
INJECTION, EMULSION INTRAVENOUS CONTINUOUS PRN
Status: DISCONTINUED | OUTPATIENT
Start: 2019-09-16 | End: 2019-09-16

## 2019-09-16 RX ORDER — HYDROCODONE BITARTRATE AND ACETAMINOPHEN 5; 325 MG/1; MG/1
1-2 TABLET ORAL EVERY 4 HOURS PRN
Qty: 18 TABLET | Refills: 0 | Status: SHIPPED | OUTPATIENT
Start: 2019-09-16 | End: 2020-02-14

## 2019-09-16 RX ORDER — CEFAZOLIN SODIUM 2 G/100ML
2 INJECTION, SOLUTION INTRAVENOUS
Status: COMPLETED | OUTPATIENT
Start: 2019-09-16 | End: 2019-09-16

## 2019-09-16 RX ORDER — HYDROCODONE BITARTRATE AND ACETAMINOPHEN 5; 325 MG/1; MG/1
1 TABLET ORAL ONCE
Status: COMPLETED | OUTPATIENT
Start: 2019-09-16 | End: 2019-09-16

## 2019-09-16 RX ORDER — AMOXICILLIN 250 MG
1-2 CAPSULE ORAL 2 TIMES DAILY PRN
Qty: 20 TABLET | Refills: 0 | Status: ON HOLD | OUTPATIENT
Start: 2019-09-16 | End: 2020-02-19

## 2019-09-16 RX ORDER — HYDROCODONE BITARTRATE AND ACETAMINOPHEN 5; 325 MG/1; MG/1
1-2 TABLET ORAL
Status: CANCELLED | OUTPATIENT
Start: 2019-09-16

## 2019-09-16 RX ORDER — SODIUM CHLORIDE, SODIUM LACTATE, POTASSIUM CHLORIDE, CALCIUM CHLORIDE 600; 310; 30; 20 MG/100ML; MG/100ML; MG/100ML; MG/100ML
INJECTION, SOLUTION INTRAVENOUS CONTINUOUS PRN
Status: DISCONTINUED | OUTPATIENT
Start: 2019-09-16 | End: 2019-09-16

## 2019-09-16 RX ORDER — DEXAMETHASONE SODIUM PHOSPHATE 4 MG/ML
INJECTION, SOLUTION INTRA-ARTICULAR; INTRALESIONAL; INTRAMUSCULAR; INTRAVENOUS; SOFT TISSUE PRN
Status: DISCONTINUED | OUTPATIENT
Start: 2019-09-16 | End: 2019-09-16

## 2019-09-16 RX ORDER — HYDROMORPHONE HYDROCHLORIDE 1 MG/ML
.3-.5 INJECTION, SOLUTION INTRAMUSCULAR; INTRAVENOUS; SUBCUTANEOUS EVERY 10 MIN PRN
Status: DISCONTINUED | OUTPATIENT
Start: 2019-09-16 | End: 2019-09-16 | Stop reason: HOSPADM

## 2019-09-16 RX ORDER — FENTANYL CITRATE 50 UG/ML
INJECTION, SOLUTION INTRAMUSCULAR; INTRAVENOUS PRN
Status: DISCONTINUED | OUTPATIENT
Start: 2019-09-16 | End: 2019-09-16

## 2019-09-16 RX ADMIN — MIDAZOLAM 2 MG: 1 INJECTION INTRAMUSCULAR; INTRAVENOUS at 10:25

## 2019-09-16 RX ADMIN — FENTANYL CITRATE 25 MCG: 50 INJECTION, SOLUTION INTRAMUSCULAR; INTRAVENOUS at 10:32

## 2019-09-16 RX ADMIN — ONDANSETRON 4 MG: 2 INJECTION INTRAMUSCULAR; INTRAVENOUS at 10:44

## 2019-09-16 RX ADMIN — FENTANYL CITRATE 25 MCG: 50 INJECTION, SOLUTION INTRAMUSCULAR; INTRAVENOUS at 10:34

## 2019-09-16 RX ADMIN — LIDOCAINE HYDROCHLORIDE 5 ML: 10 INJECTION, SOLUTION INFILTRATION; PERINEURAL at 08:46

## 2019-09-16 RX ADMIN — CEFAZOLIN SODIUM 2 G: 2 INJECTION, SOLUTION INTRAVENOUS at 10:29

## 2019-09-16 RX ADMIN — PROPOFOL 150 MCG/KG/MIN: 10 INJECTION, EMULSION INTRAVENOUS at 10:28

## 2019-09-16 RX ADMIN — SODIUM CHLORIDE, POTASSIUM CHLORIDE, SODIUM LACTATE AND CALCIUM CHLORIDE: 600; 310; 30; 20 INJECTION, SOLUTION INTRAVENOUS at 10:25

## 2019-09-16 RX ADMIN — HYDROCODONE BITARTRATE AND ACETAMINOPHEN 1 TABLET: 5; 325 TABLET ORAL at 12:20

## 2019-09-16 RX ADMIN — PROPOFOL 10 MG: 10 INJECTION, EMULSION INTRAVENOUS at 10:28

## 2019-09-16 RX ADMIN — DEXAMETHASONE SODIUM PHOSPHATE 4 MG: 4 INJECTION, SOLUTION INTRA-ARTICULAR; INTRALESIONAL; INTRAMUSCULAR; INTRAVENOUS; SOFT TISSUE at 10:35

## 2019-09-16 ASSESSMENT — MIFFLIN-ST. JEOR: SCORE: 1218.04

## 2019-09-16 NOTE — ANESTHESIA POSTPROCEDURE EVALUATION
Patient: Kaitlyn Yao    Procedure(s):  SEED LOCALIZED LEFT BREAST EXCISIONAL BIOPSY    Diagnosis:LEFT BREAST INTRADUCTAL PAPILLOMA  Diagnosis Additional Information: No value filed.    Anesthesia Type:  MAC    Note:  Anesthesia Post Evaluation    Patient location during evaluation: PACU  Patient participation: Able to fully participate in evaluation  Level of consciousness: awake  Pain management: adequate  Airway patency: patent  Cardiovascular status: acceptable  Respiratory status: acceptable  Hydration status: acceptable  PONV: controlled     Anesthetic complications: None          Last vitals:  Vitals:    09/16/19 0933 09/16/19 1130 09/16/19 1241   BP: 127/55 119/66 137/66   Pulse: 76 71    Resp: 20 20 16   Temp: 36.2  C (97.1  F) 36.7  C (98  F)    SpO2: 94% 98% 95%         Electronically Signed By: Isaiah Albert MD  September 16, 2019  1:21 PM

## 2019-09-16 NOTE — ANESTHESIA CARE TRANSFER NOTE
Patient: Kaitlyn Yao    Procedure(s):  SEED LOCALIZED LEFT BREAST EXCISIONAL BIOPSY    Diagnosis: LEFT BREAST INTRADUCTAL PAPILLOMA  Diagnosis Additional Information: No value filed.    Anesthesia Type:   MAC     Note:  Airway :Face Mask  Patient transferred to:PACU  Comments: At end of procedure, spontaneous respirations, patient alert to voice, able to follow commands. Oxygen via facemask at 8 liters per minute to PACU. Oxygen tubing connected to wall O2 in PACU, SpO2, NiBP, and EKG monitors and alarms on and functioning, report on patient's clinical status given to PACU RN, RN questions answered.Handoff Report: Identifed the Patient, Identified the Reponsible Provider, Reviewed the pertinent medical history, Discussed the surgical course, Reviewed Intra-OP anesthesia mangement and issues during anesthesia, Set expectations for post-procedure period and Allowed opportunity for questions and acknowledgement of understanding      Vitals: (Last set prior to Anesthesia Care Transfer)    CRNA VITALS  9/16/2019 1058 - 9/16/2019 1135      9/16/2019             Pulse:  79    SpO2:  91 %    Resp Rate (set):  10                Electronically Signed By: MARIAA Mcwilliams CRNA  September 16, 2019  11:35 AM

## 2019-09-16 NOTE — ANESTHESIA PREPROCEDURE EVALUATION
Anesthesia Pre-Procedure Evaluation    Patient: Kaitlyn Yao   MRN: [id number] : 1945          Preoperative Diagnosis: LEFT BREAST INTRADUCTAL PAPILLOMA    Procedure(s):  SEED LOCALIZED LEFT BREAST EXCISIONAL BIOPSY    Past Medical History:   Diagnosis Date     Diabetes mellitus (H)     type 2     Hearing loss      Hyperlipemia      Hyperlipidemia      Hypertension      Meniere's disease      Noninfectious ileitis     IBS     Pyelonephritis      Renal disease     KIDNEY STONES     Rosacea      Rosacea      Sciatica of right side      Urge incontinence      Urinary, incontinence, stress female      Past Surgical History:   Procedure Laterality Date     CHOLECYSTECTOMY       COMBINED CYSTOSCOPY, INSERT STENT URETER(S) Left 2017    Procedure: COMBINED CYSTOSCOPY, INSERT STENT URETER(S);  CYSTOSCOPY, LEFT URETERAL STENT PLACEMENT;  Surgeon: Rosalino Mejia MD;  Location:  OR     CYSTOSCOPY  2013    Procedure: CYSTOSCOPY;;  Surgeon: Matthew Ramírez MD;  Location: [facility] OR     CYSTOSCOPY BLADDER WITH STONE EXTRACTION  2017    Procedure: CYSTOSCOPY BLADDER WITH STONE EXTRACTION;;  Surgeon: Rosalino Mejia MD;  Location:  OR     CYSTOSCOPY, URETEROSCOPY, INSERT STENT Left 2017    Procedure: CYSTOSCOPY, URETEROSCOPY, INSERT STENT;  CYSTOSCOPY, LEFT URETEROSCOPY, LEFT URETERAL STENT REMOVAL, AND STONE EXTRACTION;  Surgeon: Rosalino Mejia MD;  Location:  OR     DAVINCI HYSTERECTOMY SUPRACERVICAL, SACROCOLPOPEXY, COMBINED  2013    Procedure: COMBINED DAVINCI HYSTERECTOMY SUPRACERVICAL, SACROCOLPOPEXY;  DAVINCI SUPRACERVICAL HYSTERECTOMY, BILATERAL SALPINGO OOPHORECTOMY,SACROCOLPOPEXY, PERINEORRHAPHY, BLADDER SLING (EXACT),  AND CYSTOSCOPY ;  Surgeon: Matthew Ramírez MD;  Location:  OR     ENHANCE ENDOLYMPHATIC SAC, DEC SIGMOID SINUS, EXTND FACIAL RECESS APPRCH, MASTOIDECTOMY, BMT, COMBO Left 2015    Procedure: COMBINED ENHANCE ENDOLYMPHATIC SAC,  DECOMPRESS SIGMOID SINUS, EXTENDED FACIAL RECESS APPROACH, COMPLETE MASTOIDECTOMY, MYRINGOTOMY, INSERT TUBE;  Surgeon: Dilan Azevedo MD;  Location: [facility] OR     ENT SURGERY      T&A     GENITOURINARY SURGERY      open pyelolithotomy for kidney stone removal......stenting at right kidney     ORTHOPEDIC SURGERY      Achilles tendon repair     PERINEORRHAPHY  1/11/2013    Procedure: PERINEORRHAPHY;;  Surgeon: Matthew Ramírez MD;  Location:  OR     SLING TRANSVAGINAL  1/11/2013    Procedure: SLING TRANSVAGINAL;;  Surgeon: Matthew Ramírez MD;  Location:  OR       Anesthesia Evaluation     . Pt has had prior anesthetic.     No history of anesthetic complications          ROS/MED HX    ENT/Pulmonary:      (-) sleep apnea   Neurologic:       Cardiovascular:     (+) Dyslipidemia, hypertension-range: controlled, ---. : . . . :. .       METS/Exercise Tolerance:     Hematologic:         Musculoskeletal:         GI/Hepatic:     (+) Other GI/Hepatic IBS     (-) GERD   Renal/Genitourinary:     (+) Nephrolithiasis ,       Endo:     (+) type II DM Not using insulin Obesity (BMI 30), .      Psychiatric:         Infectious Disease:         Malignancy:   (+) Malignancy History of Breast  Breast CA Active status post.         Other:                          Physical Exam  Normal systems: cardiovascular, pulmonary and dental    Airway   Mallampati: II  TM distance: >3 FB  Neck ROM: full    Dental     Cardiovascular       Pulmonary             Lab Results   Component Value Date    WBC 9.8 12/01/2017    HGB 12.4 12/01/2017    HCT 36.5 12/01/2017     12/01/2017     12/01/2017    POTASSIUM 4.3 12/01/2017    CHLORIDE 110 (H) 12/01/2017    CO2 25 12/01/2017    BUN 17 12/01/2017    CR 0.60 12/01/2017     (H) 12/01/2017    INDRA 8.4 (L) 12/01/2017    ALBUMIN 4.2 06/02/2014    PROTTOTAL 7.4 06/02/2014    ALT 22 06/02/2014    AST 30 06/02/2014    ALKPHOS 85 06/02/2014    BILITOTAL 0.6 06/02/2014       Preop  "Vitals  BP Readings from Last 3 Encounters:   08/06/19 116/64   10/31/18 133/66   12/22/17 125/59    Pulse Readings from Last 3 Encounters:   08/06/19 77   10/31/18 95   12/01/17 81      Resp Readings from Last 3 Encounters:   10/31/18 17   12/22/17 16   12/02/17 16    SpO2 Readings from Last 3 Encounters:   10/31/18 95%   12/22/17 96%   12/02/17 93%      Temp Readings from Last 1 Encounters:   10/31/18 36.2  C (97.1  F) (Oral)    Ht Readings from Last 1 Encounters:   12/22/17 1.575 m (5' 2\")      Wt Readings from Last 1 Encounters:   08/06/19 74.8 kg (165 lb)    Estimated body mass index is 30.18 kg/m  as calculated from the following:    Height as of 12/22/17: 1.575 m (5' 2\").    Weight as of 8/6/19: 74.8 kg (165 lb).       Anesthesia Plan      History & Physical Review  History and physical reviewed and following examination; no interval change.    ASA Status:  3 .    NPO Status:  > 8 hours    Plan for MAC Reason for MAC:  Procedure to face, neck, head or breast  PONV prophylaxis:  Ondansetron (or other 5HT-3)       Postoperative Care  Postoperative pain management:  IV analgesics.      Consents  Anesthetic plan, risks, benefits and alternatives discussed with:  Patient..                 Isaiah Albert MD  "

## 2019-09-16 NOTE — DISCHARGE INSTRUCTIONS
Same Day Surgery Discharge Instructions for  Sedation and General Anesthesia       It's not unusual to feel dizzy, light-headed or faint for up to 24 hours after surgery or while taking pain medication.  If you have these symptoms: sit for a few minutes before standing and have someone assist you when you get up to walk or use the bathroom.      You should rest and relax for the next 24 hours. We recommend you make arrangements to have an adult stay with you for at least 24 hours after your discharge.  Avoid hazardous and strenuous activity.      DO NOT DRIVE any vehicle or operate mechanical equipment for 24 hours following the end of your surgery.  Even though you may feel normal, your reactions may be affected by the medication you have received.      Do not drink alcoholic beverages for 24 hours following surgery.       Slowly progress to your regular diet as you feel able. It's not unusual to feel nauseated and/or vomit after receiving anesthesia.  If you develop these symptoms, drink clear liquids (apple juice, ginger ale, broth, 7-up, etc. ) until you feel better.  If your nausea and vomiting persists for 24 hours, please notify your surgeon.        All narcotic pain medications, along with inactivity and anesthesia, can cause constipation. Drinking plenty of liquids and increasing fiber intake will help.      For any questions of a medical nature, call your surgeon.      Do not make important decisions for 24 hours.      If you had general anesthesia, you may have a sore throat for a couple of days related to the breathing tube used during surgery.  You may use Cepacol lozenges to help with this discomfort.  If it worsens or if you develop a fever, contact your surgeon.       If you feel your pain is not well managed with the pain medications prescribed by your surgeon, please contact your surgeon's office to let them know so they can address your concerns.           [facility]  Discharge Instructions: Post-Operative Breast Surgery    ACTIVITY    Take frequent short walks and increase your activity gradually.      Avoid strenuous physical activity or heavy lifting greater than 15-20 lbs. for 1-2 weeks with arm on the surgery side.  You may climb stairs.    Gentle rotation and stretching of your arms and shoulders will prevent joint stiffness.    You may drive without restrictions when you are not using any prescription pain medication and feel comfortable in a car.    You may return to work/school when you are comfortable without any prescription pain medication.    WOUND CARE    You may remove your ACE wrap, outer dressing, and shower 48 hours after the surgery.  Pat your incisions dry and leave them open to air.  Re-apply dressing (Band-Aids or gauze/tape) as needed for drainage.    You may have steri-strips (looks like white tape) or Dermabond (looks like glue) on your incisions.  You may peel off the steri-strips 2 weeks after your surgery if they have not peeled off on their own.  If you have Dermabond, it will peel up and fall off on its own.    Do not soak your incisions in a tub or pool for 2 weeks.     Do not apply any lotions, creams, or ointments to your incisions.    A ridge under your incisions is normal and will gradually resolve.    Wear the ACE wrap or a supportive bra for 1-2 weeks, day and night.    DIET    Start with liquids, then gradually resume your regular diet as tolerated.     Drink plenty of liquids to stay hydrated.    PAIN    Expect some tenderness and discomfort at the incision site(s).  Use the prescribed pain medication at your discretion.  Expect gradual resolution of your pain over several days.    You may take ibuprofen with food (unless you have been told not to) instead of or in addition to your prescribed pain medication.  If you are taking Norco, do not take any additional acetaminophen/APAP/Tylenol.    Do not drink alcohol or drive while  you are taking pain medications.    You may apply ice to your incisions in 20 minute intervals as needed for the next 48 hours.      EXPECTATIONS    Pain medications can cause constipation.  Limit use when possible.  Take over the counter stool softener/stimulant, such as Colace or Senna, 1-2 times a day with plenty of water.  You may take a mild over the counter laxative, such as Miralax or a suppository, as needed.      You may discontinue these medications once you are having regular bowel movements and/or are no longer taking your narcotic pain medication.    RETURN APPOINTMENT    Follow up with your surgeon (Dr. Hubbard) in 2 weeks.  Please call the office at [phone] to schedule your appointment.      CALL OUR OFFICE -[phone] IF YOU HAVE:     Chills or fever above 101 F.    Increased redness, warmth, or drainage at your incisions.    Significant bleeding.    Pain not relieved by your pain medication or rest.    Increasing pain after the first 48 hours.    Any other concerns or questions.    **If you have concerns or questions about your procedure,    please contact Dr Hubbard at  [phone]**    Revised October 2018

## 2019-09-16 NOTE — OP NOTE
[facility] Breast Surgery Operative Note      Pre-operative diagnosis: Left breast papilloma and radial scar with nipple drainage   Post-operative diagnosis: Same, pathology pending     Procedure: 1.  LEFT SEED LOCALIZED EXCISIONAL BIOPSY AND RETROAREOLAR DUCTAL EXCISION       Surgeon: Jie Hubbard MD   Assistant(s):  Luz Maria Valenzuela PA-C  The PA s assistance was medically necessary to provide adequate exposure in the operating field, maintain hemostasis, cutting suture, clamping and ligating bleeding vessels, and visualization of anatomic structures throughout the surgical procedure.      Anesthesia: Local with MAC    Estimated blood loss:   5 cc     Specimens: ID Type Source Tests Collected by Time Destination   A : Left Breast Mass Tissue Breast, Left SURGICAL PATHOLOGY EXAM Jie Hubbard MD 9/16/2019 10:55 AM    B : left breast lateral retro areolar ductal tissue Tissue Breast, Left SURGICAL PATHOLOGY EXAM Jie Hubbard MD 9/16/2019 10:59 AM         INDICATION:  Barbara is a 74yof who presented with left breast bloody nipple discharge. She had a diagnostic mammogram and US on 7/26/2019 which revealed possible subtle focus of architectural distortion in the retroareolar left breast on 3D mammogram and a few mildly dilated ducts on ultrasound with a small simple cyst at 2:00, 3cm FN. She then had a breast MRI which revealed an enhancing 8mm mass with irregular margins in the left subareolar breast at 1:00, 2cm FN and MRI guided biopsy was recommended. She underwent biopsy which revealed a radial scar and papilloma. She elected to proceed with excision given ongoing bloody nipple discharge.   DESCRIPTION OF PROCEDURE: The patient was placed on the table in supine position. Conscious sedation was induced. Perioperative antibiotics were given. The left breast and axilla were prepped and draped in standard sterile fashion.  We used the seed placed in the [facility] as well as the post-seed mammograms to  localize the area of interest. We made a periareolar incision centered at the 10 o'clock position. We carried the incision down using electrocautery into the breast tissue and excised the area of interest, including the seed.  this dissection including the tissue behind the nipple and areola. There were several dilated ducts encountered which were filled with bloody fluid. The neoprobe was used to guide the dissection. The area of tissue was removed in its entirety with some surrounding benign appearing breast tissue. After the specimen was removed it had a high signal with the neoprobe. Once the mass was removed, it was oriented with Moran dyes. A specimen mammogram was obtained and revealed the seed in the specimen. The specimen was then sent to pathology for review.  The wound was then examined for bleeding and hemostasis was achieved using electrocautery.  there were remaining ducts to the NAC with nipple drainage with pressure. The ductal tissue on the lateral NAC and superior was all excised and oriented with a short suture superiorly and long laterally. There were remaining firm nodules in the tissue which were excised separately and also sent to pathology with the 2nd specimen.   Hemostasis was maintained throughout with electrocautery. The field was irrigated with sterile saline.     The lumpectomy cavity was reapproximated with several interrupted 3-0 vicryl sutures. The skin was closed with a deep dermal 3-0 vicryl and running 4-0 Monocryl subcuticular suture and steri strips.   The patient tolerated the procedure well.  Sponge and instrument counts were correct.    Jie Hubbard MD  [facility]  [phone]

## 2019-09-16 NOTE — PROGRESS NOTES
SBAR Seed Localization    SITUATION:  Patient to breast imaging center for imaging guided seed localizations before breast lumpectomy or excision biopsy without sentinel node injection.    BACKGROUND:  Breast imaging cancer, breast abnormality  Ordered procedure completed: Yes  Special needs identified: Yes     ASSESSMENT:  SBAR report called to patient care unit because of unexpected event in radiology: No  Allergies and medication list reviewed prior to procedure. Yes  Skin cleansed with ChloraPrep One-Step.  Anesthesia: approximately 5ml of 1% Lidocaine injection subcutaneous before seed insertion administered by the radiologist.   Gauze dressing over insertion site(s).  Post procedure mammogram completed: Yes    Patient tolerance: Patient tolerated procedure well.    RECOMMENDATIONS:  Patient transferred to Same Day Surgery in stable condition via wheelchair with Breast Imaging Staff.    Please call [facility] [phone] if there are any questions.

## 2019-09-17 ENCOUNTER — TELEPHONE (OUTPATIENT)
Dept: SURGERY | Facility: CLINIC | Age: 74
End: 2019-09-17

## 2019-09-17 LAB — COPATH REPORT: NORMAL

## 2019-09-17 NOTE — TELEPHONE ENCOUNTER
Name of caller: Patient    Reason for Call:  Patient request.  JAMEEL is supposed to call patient tomorrow with pathology results.  Patient would like JAMEEL to call her cell [phone].    Surgeon:  Dr. Hubbard    Recent Surgery:  Yes.    If yes, when & what type:  9/16/19 left breast biopsy      Best phone number to reach pt at is: [phone]    Ok to leave a message with medical info? Yes.

## 2019-10-02 ENCOUNTER — OFFICE VISIT (OUTPATIENT)
Dept: URGENT CARE | Facility: URGENT CARE | Age: 74
End: 2019-10-02
Payer: MEDICARE

## 2019-10-02 VITALS
DIASTOLIC BLOOD PRESSURE: 65 MMHG | WEIGHT: 165 LBS | SYSTOLIC BLOOD PRESSURE: 141 MMHG | HEART RATE: 99 BPM | BODY MASS INDEX: 30.18 KG/M2 | TEMPERATURE: 98.2 F | RESPIRATION RATE: 16 BRPM | OXYGEN SATURATION: 96 %

## 2019-10-02 DIAGNOSIS — J01.90 ACUTE SINUSITIS WITH COEXISTING CONDITION REQUIRING PROPHYLACTIC TREATMENT: Primary | ICD-10-CM

## 2019-10-02 DIAGNOSIS — R05.9 COUGH: ICD-10-CM

## 2019-10-02 DIAGNOSIS — J30.9 ALLERGIC RHINITIS, UNSPECIFIED SEASONALITY, UNSPECIFIED TRIGGER: ICD-10-CM

## 2019-10-02 PROCEDURE — 99214 OFFICE O/P EST MOD 30 MIN: CPT | Performed by: PHYSICIAN ASSISTANT

## 2019-10-02 RX ORDER — FLUTICASONE PROPIONATE 50 MCG
2 SPRAY, SUSPENSION (ML) NASAL DAILY
Qty: 16 G | Refills: 0 | Status: SHIPPED | OUTPATIENT
Start: 2019-10-02 | End: 2019-11-03

## 2019-10-02 RX ORDER — CEFUROXIME AXETIL 500 MG/1
500 TABLET ORAL 2 TIMES DAILY
Qty: 20 TABLET | Refills: 0 | Status: SHIPPED | OUTPATIENT
Start: 2019-10-02 | End: 2020-02-14

## 2019-10-02 RX ORDER — CODEINE PHOSPHATE AND GUAIFENESIN 10; 100 MG/5ML; MG/5ML
1-2 SOLUTION ORAL EVERY 4 HOURS PRN
Qty: 120 ML | Refills: 0 | Status: SHIPPED | OUTPATIENT
Start: 2019-10-02 | End: 2020-02-14

## 2019-10-02 NOTE — PATIENT INSTRUCTIONS
(J01.90) Acute sinusitis with coexisting condition requiring prophylactic treatment  (primary encounter diagnosis)  Comment:   Plan: cefuroxime (CEFTIN) 500 MG tablet            (R05) Cough  Comment:   Plan: guaiFENesin-codeine (ROBITUSSIN AC) 100-10         MG/5ML solution            (J30.9) Allergic rhinitis, unspecified seasonality, unspecified trigger  Comment:   Plan: fluticasone (FLONASE) 50 MCG/ACT nasal spray        Take nightly until we've had a good hard freeze    Saline nasal spray every morning.      Take probiotic while on antibiotic.

## 2019-10-02 NOTE — PROGRESS NOTES
Patient presents with:  Cough: cough, head pressure, runny nose X 3 weeks    SUBJECTIVE:   Kaitlyn Yao is a 74 year old female presenting with a chief complaint of   1) sinus congestion for 3 weeks  2) cough, mostly dry.  Denies any shortness of breath or wheezing.      Symptoms improved a bit, then worsening.    No fevers noted.      Onset of symptoms was as above  Course of illness is worsening.    Severity moderate  Current and Associated symptoms: as above  Treatment measures tried include advil and advil cold and flu with little relief.  Predisposing factors include none.    Past Medical History:   Diagnosis Date     Diabetes mellitus (H)     type 2     Hearing loss      Hyperlipemia      Hyperlipidemia      Hypertension      Meniere's disease      Noninfectious ileitis     IBS     Pyelonephritis      Renal disease     KIDNEY STONES     Rosacea      Rosacea      Sciatica of right side      Urge incontinence      Urinary, incontinence, stress female      Patient Active Problem List   Diagnosis     Uterovaginal prolapse, incomplete     Left ureteral stone     Social History     Tobacco Use     Smoking status: Former Smoker     Last attempt to quit: 1977     Years since quittin.1     Smokeless tobacco: Never Used   Substance Use Topics     Alcohol use: No       ROS:  CONSTITUTIONAL:as per HPI  INTEGUMENTARY/SKIN: NEGATIVE for worrisome rashes, moles or lesions  EYES: NEGATIVE for vision changes or irritation  ENT/MOUTH: as per HPI  RESP:as per HPI  CV: NEGATIVE for chest pain, palpitations or peripheral edema  GI: NEGATIVE for nausea, abdominal pain, heartburn, or change in bowel habits  MUSCULOSKELETAL: NEGATIVE for significant arthralgias or myalgia  NEURO: NEGATIVE for weakness, dizziness or paresthesias  Review of systems negative except as stated above.    OBJECTIVE  :BP (!) 141/65 (BP Location: Right arm, Patient Position: Sitting, Cuff Size: Adult Regular)   Pulse 99   Temp 98.2  F (36.8   C) (Oral)   Resp 16   Wt 74.8 kg (165 lb)   SpO2 96%   BMI 30.18 kg/m    GENERAL APPEARANCE: healthy, alert and no distress  EYES: EOMI,  PERRL, conjunctiva clear  HENT: ear canals and TM's normal.  Nose and mouth without ulcers, erythema or lesions  HENT: nasal turbinates boggy with bluish hue and rhinorrhea yellow  NECK: supple, nontender, no lymphadenopathy  RESP: lungs clear to auscultation - no rales, rhonchi or wheezes  CV: regular rates and rhythm, normal S1 S2, no murmur noted  ABDOMEN:  soft, nontender, no HSM or masses and bowel sounds normal  NEURO: Normal strength and tone, sensory exam grossly normal,  normal speech and mentation  SKIN: no suspicious lesions or rashes    (J01.90) Acute sinusitis with coexisting condition requiring prophylactic treatment  (primary encounter diagnosis)  Comment:   Plan: cefuroxime (CEFTIN) 500 MG tablet            (R05) Cough  Comment:   Plan: guaiFENesin-codeine (ROBITUSSIN AC) 100-10         MG/5ML solution            (J30.9) Allergic rhinitis, unspecified seasonality, unspecified trigger  Comment:   Plan: fluticasone (FLONASE) 50 MCG/ACT nasal spray        Take nightly until we've had a good hard freeze    Saline nasal spray every morning.      Take probiotic while on antibiotic.

## 2019-10-03 NOTE — PROGRESS NOTES
[facility] Breast Surgery Postoperative Note    S: Barbara reports she is doing well after retroareolar ductal excision. She had no pain after surgery. She is very happy with healing.     Breasts: left breast medial periareolar scar is healing well. No erythema or induration.     Pathology:   SPECIMEN(S):   A: Breast biopsy with margins, left   B: Left breast lateral retro areolar ductal tissue     FINAL DIAGNOSIS:   A: Left breast, mass, lumpectomy   - Multiple radial scars/complex sclerosing lesions   - Intraductal papillomas   - Flat epithelial atypia   - Florid usual ductal hyperplasia   - Proliferative fibrocystic change   - Prior biopsy site identified   - No evidence of malignancy     B: Left breast, retroareolar ductal tissue, excision   - Intraductal papilloma   - Complex sclerosing lesions   - Florid usual ductal hyperplasia   - Fibrocystic change   - No evidence of atypia or malignancy    A/P  Kaitlyn Yao is recovering from a left breast excisional biopsy on 9/16/2019 for radial scar and intraductal papilloma. There was no atypia or malignancy noted on final report. She should return to annual screening mammography going forward. She will follow up with me PRN in the future.     Thank you for the opportunity to help in her care.    Jie Hubbard MD  [facility]  [phone]    Please route or send letter to:  Primary Care Provider (PCP) and Referring Provider

## 2019-10-08 ENCOUNTER — OFFICE VISIT (OUTPATIENT)
Dept: SURGERY | Facility: CLINIC | Age: 74
End: 2019-10-08
Payer: MEDICARE

## 2019-10-08 VITALS
DIASTOLIC BLOOD PRESSURE: 78 MMHG | BODY MASS INDEX: 30.36 KG/M2 | HEART RATE: 113 BPM | SYSTOLIC BLOOD PRESSURE: 132 MMHG | HEIGHT: 62 IN | WEIGHT: 165 LBS

## 2019-10-08 DIAGNOSIS — Z09 FOLLOW-UP EXAMINATION FOLLOWING SURGERY: Primary | ICD-10-CM

## 2019-10-08 PROCEDURE — 99024 POSTOP FOLLOW-UP VISIT: CPT | Performed by: SURGERY

## 2019-10-08 ASSESSMENT — MIFFLIN-ST. JEOR: SCORE: 1211.69

## 2019-11-03 DIAGNOSIS — J30.9 ALLERGIC RHINITIS, UNSPECIFIED SEASONALITY, UNSPECIFIED TRIGGER: ICD-10-CM

## 2019-11-03 RX ORDER — FLUTICASONE PROPIONATE 50 MCG
SPRAY, SUSPENSION (ML) NASAL
Qty: 48 ML | Refills: 0 | Status: ON HOLD | OUTPATIENT
Start: 2019-11-03 | End: 2020-02-19

## 2020-02-03 ENCOUNTER — TELEPHONE (OUTPATIENT)
Dept: SURGERY | Facility: CLINIC | Age: 75
End: 2020-02-03

## 2020-02-03 DIAGNOSIS — N64.52 BLOODY DISCHARGE FROM RIGHT NIPPLE: Primary | ICD-10-CM

## 2020-02-03 NOTE — TELEPHONE ENCOUNTER
Call returned by patient. Barbara reports intermittent, bloody right nipple discharge. She is scheduled for a follow up with Dr. Hubbard on 2/14/2020. Will arrange for imaging prior to visit. Both parties in agreement of plan.    Milagros JOHNSONN, RN, OCN  Oncology Care Coordinator  [facility]  [facility]  Phone: [phone]

## 2020-02-03 NOTE — TELEPHONE ENCOUNTER
Barbara notified of the following appointments:    Diagnostic right breast mammogram followed by right breast ultrasound on 2/6/2020. Please check in at 10:15 am at [facility], [place].    Barbara verbalized understanding.    Milagros JOHNSONN, RN, OCN  Oncology Care Coordinator  [facility]  [facility]  Phone: [phone]

## 2020-02-03 NOTE — TELEPHONE ENCOUNTER
Would like a call back after 12:00 is having as nipple discharge, made appt with JAMEEL 2/14/20 but wants to discuss with breast nurse    Phone: [phone]    Message ok

## 2020-02-03 NOTE — TELEPHONE ENCOUNTER
Left message requesting call back.    Milagros JOHNSONN, RN, OCN  Oncology Care Coordinator  [facility]  [facility]  Phone: [phone]

## 2020-02-06 ENCOUNTER — HOSPITAL ENCOUNTER (OUTPATIENT)
Dept: MAMMOGRAPHY | Facility: CLINIC | Age: 75
Discharge: HOME | End: 2020-02-06
Attending: SURGERY | Admitting: SURGERY
Payer: MEDICARE

## 2020-02-06 ENCOUNTER — HOSPITAL ENCOUNTER (OUTPATIENT)
Dept: MAMMOGRAPHY | Facility: CLINIC | Age: 75
End: 2020-02-06
Attending: SURGERY
Payer: MEDICARE

## 2020-02-06 DIAGNOSIS — N64.52 BLOODY DISCHARGE FROM RIGHT NIPPLE: ICD-10-CM

## 2020-02-06 PROCEDURE — 77065 DX MAMMO INCL CAD UNI: CPT

## 2020-02-06 PROCEDURE — 76642 ULTRASOUND BREAST LIMITED: CPT | Mod: RT

## 2020-02-13 NOTE — PROGRESS NOTES
[facility] Breast Surgery Consultation    HPI:   Kaitlyn Yao is a 74 year old female who is seen due to new nipple discharge. I had last seen her on 10/8/2019 for follow up after left breast retroareolar ductal excision (9/16/2019) which revealed multiple radial scars and intraductal papillomas as well as FEAGerry Jimenez reports that in January she began having intermittent right bloody nipple discharge. She had a diagnostic mammogram and US which revealed mildly dilated ducts, no additional abnormalities.     She reports she noticed discharge in her bra two weeks ago. She has discharge every day now and moderate amounts, she is using gauze in her bra to catch the drainage. No pain. No masses. No issues on the left breast.     Imaging:     Recent Results (from the past 744 hour(s))   MA Diagnostic Right w/Ambrocio    Narrative    DIAGNOSTIC MAMMOGRAM, RIGHT, DIGITAL w/CAD AND TOMOSYNTHESIS -  2/6/2020 10:23 AM  ULTRASOUND RIGHT BREAST    HISTORY:  Right nipple discharge.     COMPARISON:  Prior mammograms in 2019 and 2018.     BREAST DENSITY: Heterogeneously dense.    FINDINGS:  No mammographic findings suspicious for malignancy.    Targeted ultrasound evaluation of the right retroareolar breast  demonstrates mildly dilated ducts with no additional sonographic  abnormalities appreciated.       Impression    IMPRESSION: BI-RADS CATEGORY: 2 - Benign Finding(s).    RECOMMENDED FOLLOW-UP: Annual Mammography, clinical follow-up.  Recommend routine annual screening mammography. Clinical follow-up is  recommended, with management dependent on the results/findings of the  physical examination. If unilateral spontaneous nipple discharge  persists, consider further evaluation with breast MRI if warranted.    The results and recommendation were communicated to the patient at the  conclusion of today's appointment.    OSMEL SLAUGHTER MD   US Breast Right Limited 1-3 Quadrants    Narrative    DIAGNOSTIC MAMMOGRAM, RIGHT,  DIGITAL w/CAD AND TOMOSYNTHESIS -  2/6/2020 10:23 AM  ULTRASOUND RIGHT BREAST    HISTORY:  Right nipple discharge.     COMPARISON:  Prior mammograms in 2019 and 2018.     BREAST DENSITY: Heterogeneously dense.    FINDINGS:  No mammographic findings suspicious for malignancy.    Targeted ultrasound evaluation of the right retroareolar breast  demonstrates mildly dilated ducts with no additional sonographic  abnormalities appreciated.       Impression    IMPRESSION: BI-RADS CATEGORY: 2 - Benign Finding(s).    RECOMMENDED FOLLOW-UP: Annual Mammography, clinical follow-up.  Recommend routine annual screening mammography. Clinical follow-up is  recommended, with management dependent on the results/findings of the  physical examination. If unilateral spontaneous nipple discharge  persists, consider further evaluation with breast MRI if warranted.    The results and recommendation were communicated to the patient at the  conclusion of today's appointment.    OSMEL SLAUGHTER MD         Past Medical History:   has a past medical history of Diabetes mellitus (H), Hearing loss, Hyperlipemia, Hyperlipidemia, Hypertension, Meniere's disease, Noninfectious ileitis, Pyelonephritis, Renal disease, Rosacea, Rosacea, Sciatica of right side, Urge incontinence, and Urinary, incontinence, stress female.      Current Outpatient Medications:      BIOTIN PO, Take 2,500 mcg by mouth 2 times daily , Disp: , Rfl:      Coenzyme Q10 (COQ-10 PO), Take 300 mg by mouth daily , Disp: , Rfl:      exenatide (BYDUREON) 2 MG SUSR, Inject 2 mg Subcutaneous once a week, Disp: , Rfl:      fluticasone (FLONASE) 50 MCG/ACT nasal spray, SHAKE LIQUID AND USE 2 SPRAYS IN EACH NOSTRIL DAILY, Disp: 48 mL, Rfl: 0     glimepiride (AMARYL) 1 MG tablet, Take 1 mg by mouth every morning (before breakfast), Disp: , Rfl:      guaiFENesin-codeine (ROBITUSSIN AC) 100-10 MG/5ML solution, Take 5-10 mLs by mouth every 4 hours as needed, Disp: 120 mL, Rfl: 0      HYDROcodone-acetaminophen (NORCO) 5-325 MG tablet, Take 1-2 tablets by mouth every 4 hours as needed for moderate to severe pain, Disp: 18 tablet, Rfl: 0     irbesartan (AVAPRO) 300 MG tablet, Take 300 mg by mouth daily , Disp: , Rfl:      Lactobacillus (PROBIOTIC ACIDOPHILUS PO), Take 1 capsule by mouth daily, Disp: , Rfl:      LORazepam (ATIVAN) 1 MG tablet, Take 0.5 tablets (0.5 mg) by mouth once as needed for anxiety (take one hr prior to breast biopsy - repeat at time of biopsy if needed for a total of 1 mg), Disp: 2 tablet, Rfl: 0     metroNIDAZOLE (METROLOTION) 0.75 % LOTN, Externally apply topically 2 times daily as needed , Disp: , Rfl:      nystatin-triamcinolone (MYCOLOG II) cream, Apply topically 2 times daily, Disp: , Rfl:      Pravastatin Sodium (PRAVACHOL PO), Take 20 mg by mouth daily., Disp: , Rfl:      Psyllium 58.12 % PACK, , Disp: , Rfl:      senna-docusate (SENOKOT-S/PERICOLACE) 8.6-50 MG tablet, Take 1-2 tablets by mouth 2 times daily as needed for constipation (While on oral opioids.), Disp: 20 tablet, Rfl: 0     triamcinolone (KENALOG) 0.1 % cream, Apply topically 3 times daily, Disp: , Rfl:     Past Surgical History:  Past Surgical History:   Procedure Laterality Date     BIOPSY BREAST SEED LOCALIZATION Left 9/16/2019    Procedure: SEED LOCALIZED LEFT BREAST EXCISIONAL BIOPSY;  Surgeon: Jie Hubbard MD;  Location:  OR     CHOLECYSTECTOMY       COMBINED CYSTOSCOPY, INSERT STENT URETER(S) Left 11/30/2017    Procedure: COMBINED CYSTOSCOPY, INSERT STENT URETER(S);  CYSTOSCOPY, LEFT URETERAL STENT PLACEMENT;  Surgeon: Rosalino Mejia MD;  Location: [facility] OR     CYSTOSCOPY  1/11/2013    Procedure: CYSTOSCOPY;;  Surgeon: Matthew Ramírez MD;  Location: [facility] OR     CYSTOSCOPY BLADDER WITH STONE EXTRACTION  12/22/2017    Procedure: CYSTOSCOPY BLADDER WITH STONE EXTRACTION;;  Surgeon: Rosalino Mejia MD;  Location: [facility] OR     CYSTOSCOPY, URETEROSCOPY, INSERT STENT Left 12/22/2017     Procedure: CYSTOSCOPY, URETEROSCOPY, INSERT STENT;  CYSTOSCOPY, LEFT URETEROSCOPY, LEFT URETERAL STENT REMOVAL, AND STONE EXTRACTION;  Surgeon: Rosalino Mejia MD;  Location: [facility] OR     DAVINCI HYSTERECTOMY SUPRACERVICAL, SACROCOLPOPEXY, COMBINED  1/11/2013    Procedure: COMBINED DAVINCI HYSTERECTOMY SUPRACERVICAL, SACROCOLPOPEXY;  DAVINCI SUPRACERVICAL HYSTERECTOMY, BILATERAL SALPINGO OOPHORECTOMY,SACROCOLPOPEXY, PERINEORRHAPHY, BLADDER SLING (EXACT),  AND CYSTOSCOPY ;  Surgeon: Matthew Ramírez MD;  Location: [facility] OR     ENHANCE ENDOLYMPHATIC SAC, DEC SIGMOID SINUS, EXTND FACIAL RECESS APPRCH, MASTOIDECTOMY, BMT, COMBO Left 11/12/2015    Procedure: COMBINED ENHANCE ENDOLYMPHATIC SAC, DECOMPRESS SIGMOID SINUS, EXTENDED FACIAL RECESS APPROACH, COMPLETE MASTOIDECTOMY, MYRINGOTOMY, INSERT TUBE;  Surgeon: Dilan Azevedo MD;  Location: [facility] OR     ENT SURGERY      T&A     GENITOURINARY SURGERY      open pyelolithotomy for kidney stone removal......stenting at right kidney     ORTHOPEDIC SURGERY      Achilles tendon repair     PERINEORRHAPHY  1/11/2013    Procedure: PERINEORRHAPHY;;  Surgeon: Matthew Ramírez MD;  Location: [facility] OR     SLING TRANSVAGINAL  1/11/2013    Procedure: SLING TRANSVAGINAL;;  Surgeon: Matthew Ramírez MD;  Location: [facility] OR           Allergies   Allergen Reactions     Chocolate Other (See Comments)     migraines     Meloxicam Itching         Social History:  Social History     Socioeconomic History     Marital status:      Spouse name: Not on file     Number of children: Not on file     Years of education: Not on file     Highest education level: Not on file   Occupational History     Not on file   Social Needs     Financial resource strain: Not on file     Food insecurity:     Worry: Not on file     Inability: Not on file     Transportation needs:     Medical: Not on file     Non-medical: Not on file   Tobacco Use     Smoking status: Former Smoker     Last attempt to quit:  "1977     Years since quittin.5     Smokeless tobacco: Never Used   Substance and Sexual Activity     Alcohol use: No     Drug use: No     Sexual activity: Not on file   Lifestyle     Physical activity:     Days per week: Not on file     Minutes per session: Not on file     Stress: Not on file   Relationships     Social connections:     Talks on phone: Not on file     Gets together: Not on file     Attends Buddhist service: Not on file     Active member of club or organization: Not on file     Attends meetings of clubs or organizations: Not on file     Relationship status: Not on file     Intimate partner violence:     Fear of current or ex partner: Not on file     Emotionally abused: Not on file     Physically abused: Not on file     Forced sexual activity: Not on file   Other Topics Concern     Not on file   Social History Narrative     Not on file        ROS:  The 10 point review of systems is negative other than noted in the HPI and above.    PE:  Vitals: /80   Pulse 82   Ht 1.575 m (5' 2\")   Wt 76.7 kg (169 lb)   BMI 30.91 kg/m    General appearance: well-nourished, sitting comfortably, no apparent distress  Psych: normal affect, pleasant  HEENT:  Head normocephalic and atraumatic, pupils equal and round, conjunctivae clear, mucous membranes moist, external ears and nose normal  Neck: Supple without thyromegaly or masses  Lungs: Respirations unlabored  Lymphatic: No cervical, or supraclavicular lymphadenopathy  Extremities: Without edema  Musculoskeletal:  Normal station and gait  Neurologic: nonfocal, grossly intact times four extremities, alert and oriented times three  Psychiatric: Mood and affect are appropriate  Skin: Without lesions or rashes    Breast:  A bilateral breast exam was performed in the supine position.. Bilateral breasts were palpated in a circumferential clockwise fashion including the supraclavicular and axillary areas.   Well healed periareolar scar on the left breast. " On the right breast there is bloody drainage from a duct an 6:00 with gentle pressure on the breast. There are no masses.     Lymph:       No supraclavicular/infraclavicular adenopathy.   Axillary adenopathy: none    Assessment/Plan: Kaitlyn Yao is a 74 year old who presents with RIGHT bloody nipple discharge. We discussed the potential etiologies of bloody nipple discharge. It is reassuring that her imaging is benign; however this is pathologic discharge. We next discussed options of breast MRI for further evaluation vs retroareolar ductal excision on the right for diagnosis and definitive treatment. She would like to proceed with right retroareolar ductal excision.       Jie Hubbard MD      Please route or send letter to:  Primary Care Provider (PCP) and Referring Provider

## 2020-02-14 ENCOUNTER — PREP FOR PROCEDURE (OUTPATIENT)
Dept: SURGERY | Facility: CLINIC | Age: 75
End: 2020-02-14

## 2020-02-14 ENCOUNTER — OFFICE VISIT (OUTPATIENT)
Dept: SURGERY | Facility: CLINIC | Age: 75
End: 2020-02-14
Payer: MEDICARE

## 2020-02-14 ENCOUNTER — TELEPHONE (OUTPATIENT)
Dept: SURGERY | Facility: PHYSICIAN GROUP | Age: 75
End: 2020-02-14

## 2020-02-14 VITALS
SYSTOLIC BLOOD PRESSURE: 128 MMHG | BODY MASS INDEX: 31.1 KG/M2 | HEIGHT: 62 IN | DIASTOLIC BLOOD PRESSURE: 80 MMHG | HEART RATE: 82 BPM | WEIGHT: 169 LBS

## 2020-02-14 DIAGNOSIS — N64.52 BLOODY DISCHARGE FROM RIGHT NIPPLE: Primary | ICD-10-CM

## 2020-02-14 PROBLEM — E11.9 DIABETES MELLITUS, TYPE 2 (H): Status: ACTIVE | Noted: 2020-02-14

## 2020-02-14 PROCEDURE — 99214 OFFICE O/P EST MOD 30 MIN: CPT | Performed by: SURGERY

## 2020-02-14 ASSESSMENT — MIFFLIN-ST. JEOR: SCORE: 1229.83

## 2020-02-14 NOTE — TELEPHONE ENCOUNTER
Type of surgery: Right breast retroareolar ductal excision  Location of surgery: [facility]  Date and time of surgery: 2/19/20 at 2:10pm  Surgeon: Dr. Jie Hubbard  Pre-Op Appt Date: Patient to schedule  Post-Op Appt Date: Patient to schedule   Packet sent out: Yes  Pre-cert/Authorization completed:  Not Applicable  Date: 2/14/20

## 2020-02-19 ENCOUNTER — ANESTHESIA EVENT (OUTPATIENT)
Dept: SURGERY | Facility: CLINIC | Age: 75
End: 2020-02-19
Payer: MEDICARE

## 2020-02-19 ENCOUNTER — ANESTHESIA (OUTPATIENT)
Dept: SURGERY | Facility: CLINIC | Age: 75
End: 2020-02-19
Payer: MEDICARE

## 2020-02-19 ENCOUNTER — APPOINTMENT (OUTPATIENT)
Dept: SURGERY | Facility: PHYSICIAN GROUP | Age: 75
End: 2020-02-19
Payer: MEDICARE

## 2020-02-19 ENCOUNTER — HOSPITAL ENCOUNTER (OUTPATIENT)
Facility: CLINIC | Age: 75
Discharge: HOME | End: 2020-02-19
Attending: SURGERY | Admitting: SURGERY
Payer: MEDICARE

## 2020-02-19 VITALS
TEMPERATURE: 98 F | WEIGHT: 169 LBS | HEIGHT: 62 IN | OXYGEN SATURATION: 93 % | SYSTOLIC BLOOD PRESSURE: 131 MMHG | HEART RATE: 70 BPM | BODY MASS INDEX: 31.1 KG/M2 | DIASTOLIC BLOOD PRESSURE: 62 MMHG | RESPIRATION RATE: 16 BRPM

## 2020-02-19 DIAGNOSIS — G89.18 POST-OP PAIN: Primary | ICD-10-CM

## 2020-02-19 DIAGNOSIS — N64.52 BLOODY DISCHARGE FROM RIGHT NIPPLE: ICD-10-CM

## 2020-02-19 PROCEDURE — 71000012 ZZH RECOVERY PHASE 1 LEVEL 1 FIRST HR: Performed by: SURGERY

## 2020-02-19 PROCEDURE — 71000027 ZZH RECOVERY PHASE 2 EACH 15 MINS: Performed by: SURGERY

## 2020-02-19 PROCEDURE — 37000008 ZZH ANESTHESIA TECHNICAL FEE, 1ST 30 MIN: Performed by: SURGERY

## 2020-02-19 PROCEDURE — 27210794 ZZH OR GENERAL SUPPLY STERILE: Performed by: SURGERY

## 2020-02-19 PROCEDURE — 37000009 ZZH ANESTHESIA TECHNICAL FEE, EACH ADDTL 15 MIN: Performed by: SURGERY

## 2020-02-19 PROCEDURE — 36000052 ZZH SURGERY LEVEL 2 EA 15 ADDTL MIN: Performed by: SURGERY

## 2020-02-19 PROCEDURE — 40000170 ZZH STATISTIC PRE-PROCEDURE ASSESSMENT II: Performed by: SURGERY

## 2020-02-19 PROCEDURE — 25000125 ZZHC RX 250: Performed by: SURGERY

## 2020-02-19 PROCEDURE — 36000050 ZZH SURGERY LEVEL 2 1ST 30 MIN: Performed by: SURGERY

## 2020-02-19 PROCEDURE — 88307 TISSUE EXAM BY PATHOLOGIST: CPT | Mod: 26 | Performed by: SURGERY

## 2020-02-19 PROCEDURE — 25000128 H RX IP 250 OP 636: Performed by: SURGERY

## 2020-02-19 PROCEDURE — 88307 TISSUE EXAM BY PATHOLOGIST: CPT | Performed by: SURGERY

## 2020-02-19 PROCEDURE — 25000125 ZZHC RX 250: Performed by: NURSE ANESTHETIST, CERTIFIED REGISTERED

## 2020-02-19 PROCEDURE — 25000128 H RX IP 250 OP 636: Performed by: NURSE ANESTHETIST, CERTIFIED REGISTERED

## 2020-02-19 PROCEDURE — 25800030 ZZH RX IP 258 OP 636: Performed by: NURSE ANESTHETIST, CERTIFIED REGISTERED

## 2020-02-19 RX ORDER — HYDROCODONE BITARTRATE AND ACETAMINOPHEN 5; 325 MG/1; MG/1
1 TABLET ORAL
Status: DISCONTINUED | OUTPATIENT
Start: 2020-02-19 | End: 2020-02-19 | Stop reason: HOSPADM

## 2020-02-19 RX ORDER — AMOXICILLIN 250 MG
1 CAPSULE ORAL 2 TIMES DAILY
Qty: 15 TABLET | Refills: 0 | Status: SHIPPED | OUTPATIENT
Start: 2020-02-19 | End: 2020-02-26

## 2020-02-19 RX ORDER — HYDROCODONE BITARTRATE AND ACETAMINOPHEN 5; 325 MG/1; MG/1
1 TABLET ORAL EVERY 4 HOURS PRN
Qty: 12 TABLET | Refills: 0 | Status: SHIPPED | OUTPATIENT
Start: 2020-02-19

## 2020-02-19 RX ORDER — SODIUM CHLORIDE, SODIUM LACTATE, POTASSIUM CHLORIDE, CALCIUM CHLORIDE 600; 310; 30; 20 MG/100ML; MG/100ML; MG/100ML; MG/100ML
INJECTION, SOLUTION INTRAVENOUS CONTINUOUS PRN
Status: DISCONTINUED | OUTPATIENT
Start: 2020-02-19 | End: 2020-02-19

## 2020-02-19 RX ORDER — CEFAZOLIN SODIUM 2 G/100ML
2 INJECTION, SOLUTION INTRAVENOUS
Status: COMPLETED | OUTPATIENT
Start: 2020-02-19 | End: 2020-02-19

## 2020-02-19 RX ORDER — MAGNESIUM HYDROXIDE 1200 MG/15ML
LIQUID ORAL PRN
Status: DISCONTINUED | OUTPATIENT
Start: 2020-02-19 | End: 2020-02-19 | Stop reason: HOSPADM

## 2020-02-19 RX ORDER — CEFAZOLIN SODIUM 1 G/3ML
1 INJECTION, POWDER, FOR SOLUTION INTRAMUSCULAR; INTRAVENOUS SEE ADMIN INSTRUCTIONS
Status: DISCONTINUED | OUTPATIENT
Start: 2020-02-19 | End: 2020-02-19 | Stop reason: HOSPADM

## 2020-02-19 RX ORDER — LIDOCAINE HYDROCHLORIDE 20 MG/ML
INJECTION, SOLUTION INFILTRATION; PERINEURAL PRN
Status: DISCONTINUED | OUTPATIENT
Start: 2020-02-19 | End: 2020-02-19

## 2020-02-19 RX ORDER — PROPOFOL 10 MG/ML
INJECTION, EMULSION INTRAVENOUS CONTINUOUS PRN
Status: DISCONTINUED | OUTPATIENT
Start: 2020-02-19 | End: 2020-02-19

## 2020-02-19 RX ORDER — ONDANSETRON 2 MG/ML
INJECTION INTRAMUSCULAR; INTRAVENOUS PRN
Status: DISCONTINUED | OUTPATIENT
Start: 2020-02-19 | End: 2020-02-19

## 2020-02-19 RX ADMIN — MIDAZOLAM 1 MG: 1 INJECTION INTRAMUSCULAR; INTRAVENOUS at 13:52

## 2020-02-19 RX ADMIN — CEFAZOLIN SODIUM 2 G: 2 INJECTION, SOLUTION INTRAVENOUS at 14:01

## 2020-02-19 RX ADMIN — PROPOFOL 100 MCG/KG/MIN: 10 INJECTION, EMULSION INTRAVENOUS at 13:57

## 2020-02-19 RX ADMIN — LIDOCAINE HYDROCHLORIDE 40 MG: 20 INJECTION, SOLUTION INFILTRATION; PERINEURAL at 13:57

## 2020-02-19 RX ADMIN — SODIUM CHLORIDE, POTASSIUM CHLORIDE, SODIUM LACTATE AND CALCIUM CHLORIDE: 600; 310; 30; 20 INJECTION, SOLUTION INTRAVENOUS at 13:52

## 2020-02-19 RX ADMIN — ONDANSETRON 4 MG: 2 INJECTION INTRAMUSCULAR; INTRAVENOUS at 14:02

## 2020-02-19 ASSESSMENT — COPD QUESTIONNAIRES: COPD: 0

## 2020-02-19 ASSESSMENT — LIFESTYLE VARIABLES: TOBACCO_USE: 0

## 2020-02-19 ASSESSMENT — MIFFLIN-ST. JEOR: SCORE: 1229.83

## 2020-02-19 ASSESSMENT — ENCOUNTER SYMPTOMS
DYSRHYTHMIAS: 0
SEIZURES: 0

## 2020-02-19 NOTE — ANESTHESIA POSTPROCEDURE EVALUATION
Patient: Kaitlyn Yao    Procedure(s):  RIGHT BREAST RETROAREOLAR DUCTAL EXCISION    Diagnosis:Bloody discharge from right nipple [N64.52]  Diagnosis Additional Information: No value filed.    Anesthesia Type:  MAC    Note:  Anesthesia Post Evaluation    Patient location during evaluation: PACU  Patient participation: Able to fully participate in evaluation  Level of consciousness: awake and alert  Pain management: adequate  Airway patency: patent  Cardiovascular status: acceptable, blood pressure returned to baseline and hemodynamically stable  Respiratory status: acceptable and nasal cannula  Hydration status: acceptable  PONV: none     Anesthetic complications: None          Last vitals:  Vitals:    02/19/20 1500 02/19/20 1515 02/19/20 1559   BP: 125/65 138/75 131/62   Pulse: 73 70    Resp: 10 16 16   Temp:      SpO2: 98% 92% 93%         Electronically Signed By: Amie Romero MD  February 19, 2020  5:13 PM

## 2020-02-19 NOTE — OR NURSING
PATIENT INJURED RIGHT SHOULDER FROM SLIPPING ON THE ICE. WILL HAVE SURGERY IN A COUPLE OF MONTHS. HAS LIMITED ROM.

## 2020-02-19 NOTE — ANESTHESIA CARE TRANSFER NOTE
Patient: Kaitlyn Yao    Procedure(s):  RIGHT BREAST RETROAREOLAR DUCTAL EXCISION    Diagnosis: Bloody discharge from right nipple [N64.52]  Diagnosis Additional Information: No value filed.    Anesthesia Type:   MAC     Note:  Airway :Face Mask  Patient transferred to:PACU  Handoff Report: Identifed the Patient, Identified the Reponsible Provider, Reviewed the pertinent medical history, Discussed the surgical course, Reviewed Intra-OP anesthesia mangement and issues during anesthesia, Set expectations for post-procedure period and Allowed opportunity for questions and acknowledgement of understanding      Vitals: (Last set prior to Anesthesia Care Transfer)    CRNA VITALS  2/19/2020 1409 - 2/19/2020 1444      2/19/2020             Resp Rate (set):  10                Electronically Signed By: MARIAA zAul CRNA  February 19, 2020  2:44 PM

## 2020-02-19 NOTE — ANESTHESIA PREPROCEDURE EVALUATION
Anesthesia Pre-Procedure Evaluation    Patient: Kaitlyn Yao   MRN: [id number] : 1945          Preoperative Diagnosis: Bloody discharge from right nipple [N64.52]    Procedure(s):  RIGHT BREAST RETROAREOLAR DUCTAL EXCISION    Past Medical History:   Diagnosis Date     Deafness in left ear      Diabetes mellitus (H)     type 2     Hearing loss      Hyperlipemia      Hyperlipidemia      Hypertension      Injury of shoulder     RIGHT     Meniere's disease      Noninfectious ileitis     IBS     Pyelonephritis      Renal disease     KIDNEY STONES     Rosacea      Rosacea      Sciatica of right side      Urge incontinence      Urinary, incontinence, stress female      Past Surgical History:   Procedure Laterality Date     BIOPSY BREAST SEED LOCALIZATION Left 2019    Procedure: SEED LOCALIZED LEFT BREAST EXCISIONAL BIOPSY;  Surgeon: Jie Hubbard MD;  Location:  OR     BREAST SURGERY       CHOLECYSTECTOMY       COMBINED CYSTOSCOPY, INSERT STENT URETER(S) Left 2017    Procedure: COMBINED CYSTOSCOPY, INSERT STENT URETER(S);  CYSTOSCOPY, LEFT URETERAL STENT PLACEMENT;  Surgeon: Rosalino Meija MD;  Location:  OR     CYSTOSCOPY  2013    Procedure: CYSTOSCOPY;;  Surgeon: Matthew Ramírez MD;  Location:  OR     CYSTOSCOPY BLADDER WITH STONE EXTRACTION  2017    Procedure: CYSTOSCOPY BLADDER WITH STONE EXTRACTION;;  Surgeon: Rosalino Mejia MD;  Location:  OR     CYSTOSCOPY, URETEROSCOPY, INSERT STENT Left 2017    Procedure: CYSTOSCOPY, URETEROSCOPY, INSERT STENT;  CYSTOSCOPY, LEFT URETEROSCOPY, LEFT URETERAL STENT REMOVAL, AND STONE EXTRACTION;  Surgeon: Rosalino Mejia MD;  Location:  OR     DAVINCI HYSTERECTOMY SUPRACERVICAL, SACROCOLPOPEXY, COMBINED  2013    Procedure: COMBINED DAVINCI HYSTERECTOMY SUPRACERVICAL, SACROCOLPOPEXY;  DAVINCI SUPRACERVICAL HYSTERECTOMY, BILATERAL SALPINGO OOPHORECTOMY,SACROCOLPOPEXY, PERINEORRHAPHY, BLADDER SLING (EXACT),   AND CYSTOSCOPY ;  Surgeon: Matthew Ramírez MD;  Location:  OR     ENHANCE ENDOLYMPHATIC SAC, DEC SIGMOID SINUS, EXTND FACIAL RECESS APPRCH, MASTOIDECTOMY, BMT, COMBO Left 11/12/2015    Procedure: COMBINED ENHANCE ENDOLYMPHATIC SAC, DECOMPRESS SIGMOID SINUS, EXTENDED FACIAL RECESS APPROACH, COMPLETE MASTOIDECTOMY, MYRINGOTOMY, INSERT TUBE;  Surgeon: Dilan Azevedo MD;  Location:  OR     ENT SURGERY      T&A     GENITOURINARY SURGERY      open pyelolithotomy for kidney stone removal......stenting at right kidney     ORTHOPEDIC SURGERY      Achilles tendon repair     PERINEORRHAPHY  1/11/2013    Procedure: PERINEORRHAPHY;;  Surgeon: Matthew Ramírez MD;  Location:  OR     SLING TRANSVAGINAL  1/11/2013    Procedure: SLING TRANSVAGINAL;;  Surgeon: Matthew Ramírez MD;  Location:  OR       Anesthesia Evaluation     . Pt has had prior anesthetic. Type: General    No history of anesthetic complications          ROS/MED HX    ENT/Pulmonary:      (-) tobacco use, asthma, COPD, sleep apnea and allergic rhinitis   Neurologic:     (+)other neuro menieres   (-) seizures, CVA and migraines   Cardiovascular:     (+) Dyslipidemia, hypertension-range: controlled, ---. : . . . :. .      (-) CAD, FOWLER, arrhythmias and valvular problems/murmurs   METS/Exercise Tolerance:  >4 METS   Hematologic:        (-) history of blood clots, anemia and other hematologic disorder   Musculoskeletal:   (+)  other musculoskeletal- sciatica     (-) arthritis   GI/Hepatic:     (+) Other GI/Hepatic IBS     (-) GERD and liver disease   Renal/Genitourinary:     (+) Nephrolithiasis ,    (-) renal disease   Endo:     (+) type II DM Not using insulin Obesity (BMI 30), .   (-) Type I DM   Psychiatric:        (-) psychiatric history   Infectious Disease:         Malignancy:   (+) Malignancy History of Breast  Breast CA Active status post.         Other:                          Physical Exam  Normal systems: cardiovascular, pulmonary and  "dental    Airway   Mallampati: II  TM distance: >3 FB  Neck ROM: full    Dental     Cardiovascular   Rhythm and rate: regular and normal  (-) no murmur    Pulmonary    breath sounds clear to auscultation            Lab Results   Component Value Date    WBC 9.8 12/01/2017    HGB 12.4 12/01/2017    HCT 36.5 12/01/2017     12/01/2017     12/01/2017    POTASSIUM 4.3 12/01/2017    CHLORIDE 110 (H) 12/01/2017    CO2 25 12/01/2017    BUN 17 12/01/2017    CR 0.60 12/01/2017     (H) 12/01/2017    INDRA 8.4 (L) 12/01/2017    ALBUMIN 4.2 06/02/2014    PROTTOTAL 7.4 06/02/2014    ALT 22 06/02/2014    AST 30 06/02/2014    ALKPHOS 85 06/02/2014    BILITOTAL 0.6 06/02/2014       Preop Vitals  BP Readings from Last 3 Encounters:   02/19/20 (!) 140/86   02/14/20 128/80   10/08/19 132/78    Pulse Readings from Last 3 Encounters:   02/14/20 82   10/08/19 113   10/02/19 99      Resp Readings from Last 3 Encounters:   02/19/20 16   10/02/19 16   09/16/19 16    SpO2 Readings from Last 3 Encounters:   02/19/20 97%   10/02/19 96%   09/16/19 95%      Temp Readings from Last 1 Encounters:   02/19/20 36.4  C (97.6  F) (Oral)    Ht Readings from Last 1 Encounters:   02/19/20 1.575 m (5' 2\")      Wt Readings from Last 1 Encounters:   02/19/20 76.7 kg (169 lb)    Estimated body mass index is 30.91 kg/m  as calculated from the following:    Height as of this encounter: 1.575 m (5' 2\").    Weight as of this encounter: 76.7 kg (169 lb).       Anesthesia Plan      History & Physical Review      ASA Status:  3 .    NPO Status:  > 8 hours    Plan for MAC Reason for MAC:  Deep or markedly invasive procedure (G8)  PONV prophylaxis:  Ondansetron (or other 5HT-3)  Propofol infusion      Postoperative Care  Postoperative pain management:  Multi-modal analgesia.      Consents  Anesthetic plan, risks, benefits and alternatives discussed with:  Patient..                 Amie Romero MD  "

## 2020-02-19 NOTE — DISCHARGE INSTRUCTIONS
Same Day Surgery Discharge Instructions for  Sedation and General Anesthesia       It's not unusual to feel dizzy, light-headed or faint for up to 24 hours after surgery or while taking pain medication.  If you have these symptoms: sit for a few minutes before standing and have someone assist you when you get up to walk or use the bathroom.      You should rest and relax for the next 24 hours. We recommend you make arrangements to have an adult stay with you for at least 24 hours after your discharge.  Avoid hazardous and strenuous activity.      DO NOT DRIVE any vehicle or operate mechanical equipment for 24 hours following the end of your surgery.  Even though you may feel normal, your reactions may be affected by the medication you have received.      Do not drink alcoholic beverages for 24 hours following surgery.       Slowly progress to your regular diet as you feel able. It's not unusual to feel nauseated and/or vomit after receiving anesthesia.  If you develop these symptoms, drink clear liquids (apple juice, ginger ale, broth, 7-up, etc. ) until you feel better.  If your nausea and vomiting persists for 24 hours, please notify your surgeon.        All narcotic pain medications, along with inactivity and anesthesia, can cause constipation. Drinking plenty of liquids and increasing fiber intake will help.      For any questions of a medical nature, call your surgeon.      Do not make important decisions for 24 hours.      If you had general anesthesia, you may have a sore throat for a couple of days related to the breathing tube used during surgery.  You may use Cepacol lozenges to help with this discomfort.  If it worsens or if you develop a fever, contact your surgeon.       If you feel your pain is not well managed with the pain medications prescribed by your surgeon, please contact your surgeon's office to let them know so they can address your concerns.       [facility]  [facility]  Discharge Instructions: Post-Operative Breast Surgery    ACTIVITY    Take frequent short walks and increase your activity gradually.      Avoid strenuous physical activity or heavy lifting greater than 15-20 lbs. for 1-2 weeks with arm on the surgery side.  You may climb stairs.    Gentle rotation and stretching of your arms and shoulders will prevent joint stiffness.    You may drive without restrictions when you are not using any prescription pain medication and feel comfortable in a car.    You may return to work/school when you are comfortable without any prescription pain medication.    WOUND CARE    You may remove your outer dressing and shower 48 hours after the surgery.  Pat your incisions dry and leave them open to air.  Re-apply dressing (Band-Aids or gauze/tape) as needed for drainage.    You may have steri-strips (looks like white tape) or Dermabond (looks like glue) on your incisions.  You may peel off the steri-strips 2 weeks after your surgery if they have not peeled off on their own.  If you have Dermabond, it will peel up and fall off on its own.    Do not soak your incisions in a tub or pool for 2 weeks.     Do not apply any lotions, creams, or ointments to your incisions.    A ridge under your incisions is normal and will gradually resolve.    Wear a supportive bra for 1-2 weeks, day and night.    DIET    Start with liquids, then gradually resume your regular diet as tolerated.     Drink plenty of liquids to stay hydrated.    PAIN    Expect some tenderness and discomfort at the incision site(s).  Use the prescribed pain medication at your discretion.  Expect gradual resolution of your pain over several days.    You may take ibuprofen with food (unless you have been told not to) instead of or in addition to your prescribed pain medication.  If you are taking Norco or Percocet, do not take any additional acetaminophen/APAP/Tylenol.    Do not drink alcohol or drive while you are taking  pain medications.    You may apply ice to your incisions in 20 minute intervals as needed for the next 48 hours.      EXPECTATIONS    Pain medications can cause constipation.  Limit use when possible.  Take over the counter stool softener/stimulant, such as Colace or Senna, 1-2 times a day with plenty of water.  You may take a mild over the counter laxative, such as Miralax or a suppository, as needed.      You may discontinue these medications once you are having regular bowel movements and/or are no longer taking your narcotic pain medication.    Blue dye may have been used during your surgery to locate lymph nodes and can cause your urine to be blue/green for several days after surgery.  This is not a cause for concern and will resolve on its own.     RETURN APPOINTMENT    Follow up with your surgeon in 2 weeks.  Please call the office at [phone] to schedule your appointment.      CALL OUR OFFICE -[phone] IF YOU HAVE:     Chills or fever above 101 F.    Increased redness, warmth, or drainage at your incisions.    Significant bleeding.    Pain not relieved by your pain medication or rest.    Increasing pain after the first 48 hours.    Any other concerns or questions.    Revised October 2018

## 2020-02-19 NOTE — OP NOTE
DATE OF PROCEDURE:     PREOPERATIVE DIAGNOSIS:  Right bloody nipple discharge.       POSTOPERATIVE DIAGNOSIS:  Right bloody nipple discharge.  Pathology pending.       PROCEDURE:  Right breast retroareolar duct excision.         ASSISTANT:   Chantell Gilbert PA-C      ANESTHESIA:  MAC with Local.       ESTIMATED BLOOD LOSS:  2 mL.       COMPLICATIONS:  None.       SPONGE AND NEEDLE COUNTS:  Correct x2.       FINDINGS:  several enlarged retroareolar ducts      INDICATION:  Kaitlyn Yao is a 74 year old female who is seen due to new nipple discharge. I had last seen her on 10/8/2019 for follow up after left breast retroareolar ductal excision (9/16/2019) which revealed multiple radial scars and intraductal papillomas as well as FEAGerry  Barbara reports that in January she began having intermittent right bloody nipple discharge. She had a diagnostic mammogram and US which revealed mildly dilated ducts, no additional abnormalities. She elected to proceed with retroareolar duct excision given the bloody drainage.       DETAILS OF OPERATION:  The patient was taken to the operating room and placed in the supine position.  The right breast was prepped with ChloraPrep and draped in the usual sterile fashion.  The planned incision was marked and the timeout procedure was performed.  The area was anesthetized with local anesthetic.  A small periareolar incision was made on the inferior edge of the right nipple-areolar complex with a #15 scalpel blade and deepened with electrocautery.  The areolar skin was then elevated off of the underlying tissue in the subcutaneous plane with electrocautery.  The ductal tissue was then freed from behind the nipple in a sharp fashion with a 15 blade. The underlying tissue was then dissected back circumferentially to excise the retroareolar disc of a 2cm span of tissue.   The retroareolar tissue was oriented with a stitch towards the nipple, and all of the specimens were placed together in formalin  to be sent to pathology for permanent section.  The wound was examined for bleeding and hemostasis was achieved using electrocautery.  The breast tissue was approximated using interrupted 3-0 Vicryl sutures and the skin was closed with 4-0 Vicryl subcuticular stitch.  Steri-Strips and dry sterile dressings were applied.  The patient tolerated the procedure very well and was transferred to the Phase II recovery area in good condition.     Jie Hubbard MD  [facility]  [phone]

## 2020-02-21 LAB — COPATH REPORT: NORMAL

## 2020-03-04 NOTE — PROGRESS NOTES
[facility] Breast Surgery Postoperative Note    S: Barbara is doing well after surgery. She reports a bit more pain than she had on the left (no pain on the left after surgery). She used some tylenol and ibuprofen with improvement.     Breasts: right breast inferior periareolar scar is healing well. There is a single scab on the nipple. No erythema or induration.     Pathology:   Right breast, retroareolar area, open surgical excision:   -Extensive intraductal papillomatosis, no evidence of atypia   -Benign background proliferative fibrocystic changes with calcifications   present in benign duct-lobular   structures     A/P  Kaitlyn Yao is recovering from a right breast retroareolar ductal excision on 2/19/2020 due to bloody right nipple discharge. Her pathology was benign as above, multiple intraductal papillomatosis. She is healing well. No evidence of infection. She will proceed with routine annual mammography going forward and follow up prn.      Thank you for the opportunity to help in her care.    Jie Hubbard MD  [facility]  [phone]    Please route or send letter to:  Primary Care Provider (PCP) and Referring Provider

## 2020-03-06 ENCOUNTER — OFFICE VISIT (OUTPATIENT)
Dept: SURGERY | Facility: CLINIC | Age: 75
End: 2020-03-06
Payer: MEDICARE

## 2020-03-06 VITALS — HEIGHT: 62 IN | BODY MASS INDEX: 31.1 KG/M2 | WEIGHT: 169 LBS

## 2020-03-06 DIAGNOSIS — Z09 SURGERY FOLLOW-UP EXAMINATION: Primary | ICD-10-CM

## 2020-03-06 PROCEDURE — 99024 POSTOP FOLLOW-UP VISIT: CPT | Performed by: SURGERY

## 2020-03-06 ASSESSMENT — MIFFLIN-ST. JEOR: SCORE: 1229.83

## 2025-07-06 ENCOUNTER — APPOINTMENT (OUTPATIENT)
Dept: CT IMAGING | Facility: CLINIC | Age: 80
End: 2025-07-06
Attending: SOCIAL WORKER
Payer: MEDICARE

## 2025-07-06 ENCOUNTER — HOSPITAL ENCOUNTER (EMERGENCY)
Facility: CLINIC | Age: 80
Discharge: HOME | End: 2025-07-06
Attending: SOCIAL WORKER | Admitting: SOCIAL WORKER
Payer: MEDICARE

## 2025-07-06 VITALS
OXYGEN SATURATION: 94 % | TEMPERATURE: 97.6 F | SYSTOLIC BLOOD PRESSURE: 156 MMHG | RESPIRATION RATE: 16 BRPM | HEART RATE: 96 BPM | DIASTOLIC BLOOD PRESSURE: 97 MMHG

## 2025-07-06 DIAGNOSIS — R41.3 MEMORY IMPAIRMENT: ICD-10-CM

## 2025-07-06 DIAGNOSIS — R73.9 HYPERGLYCEMIA: ICD-10-CM

## 2025-07-06 LAB
ALBUMIN SERPL BCG-MCNC: 3.8 G/DL (ref 3.5–5.2)
ALBUMIN UR-MCNC: NEGATIVE MG/DL
ALP SERPL-CCNC: 77 U/L (ref 40–150)
ALT SERPL W P-5'-P-CCNC: 28 U/L (ref 0–50)
ANION GAP SERPL CALCULATED.3IONS-SCNC: 14 MMOL/L (ref 7–15)
APPEARANCE UR: CLEAR
AST SERPL W P-5'-P-CCNC: 19 U/L (ref 0–45)
ATRIAL RATE - MUSE: 103 BPM
ATRIAL RATE - MUSE: NORMAL BPM
B-OH-BUTYR SERPL-SCNC: <0.18 MMOL/L
BASE EXCESS BLDV CALC-SCNC: -0.3 MMOL/L (ref -3–3)
BASOPHILS # BLD AUTO: 0 10E3/UL (ref 0–0.2)
BASOPHILS NFR BLD AUTO: 0 %
BILIRUB SERPL-MCNC: 0.5 MG/DL
BILIRUB UR QL STRIP: NEGATIVE
BUN SERPL-MCNC: 15.5 MG/DL (ref 8–23)
CALCIUM SERPL-MCNC: 9.3 MG/DL (ref 8.8–10.4)
CHLORIDE SERPL-SCNC: 97 MMOL/L (ref 98–107)
COLOR UR AUTO: ABNORMAL
CREAT SERPL-MCNC: 0.54 MG/DL (ref 0.51–0.95)
DIASTOLIC BLOOD PRESSURE - MUSE: NORMAL MMHG
DIASTOLIC BLOOD PRESSURE - MUSE: NORMAL MMHG
EGFRCR SERPLBLD CKD-EPI 2021: >90 ML/MIN/1.73M2
EOSINOPHIL # BLD AUTO: 0.1 10E3/UL (ref 0–0.7)
EOSINOPHIL NFR BLD AUTO: 1 %
ERYTHROCYTE [DISTWIDTH] IN BLOOD BY AUTOMATED COUNT: 13.3 % (ref 10–15)
GLUCOSE BLDC GLUCOMTR-MCNC: 460 MG/DL (ref 70–99)
GLUCOSE SERPL-MCNC: 532 MG/DL (ref 70–99)
GLUCOSE UR STRIP-MCNC: >=1000 MG/DL
HCO3 BLDV-SCNC: 25 MMOL/L (ref 21–28)
HCO3 SERPL-SCNC: 22 MMOL/L (ref 22–29)
HCT VFR BLD AUTO: 45 % (ref 35–47)
HGB BLD-MCNC: 15.3 G/DL (ref 11.7–15.7)
HGB UR QL STRIP: NEGATIVE
HOLD SPECIMEN: NORMAL
IMM GRANULOCYTES # BLD: 0 10E3/UL
IMM GRANULOCYTES NFR BLD: 0 %
INTERPRETATION ECG - MUSE: NORMAL
INTERPRETATION ECG - MUSE: NORMAL
KETONES UR STRIP-MCNC: NEGATIVE MG/DL
LEUKOCYTE ESTERASE UR QL STRIP: NEGATIVE
LYMPHOCYTES # BLD AUTO: 2.4 10E3/UL (ref 0.8–5.3)
LYMPHOCYTES NFR BLD AUTO: 35 %
MCH RBC QN AUTO: 31.2 PG (ref 26.5–33)
MCHC RBC AUTO-ENTMCNC: 34 G/DL (ref 31.5–36.5)
MCV RBC AUTO: 92 FL (ref 78–100)
MONOCYTES # BLD AUTO: 0.4 10E3/UL (ref 0–1.3)
MONOCYTES NFR BLD AUTO: 6 %
NEUTROPHILS # BLD AUTO: 4 10E3/UL (ref 1.6–8.3)
NEUTROPHILS NFR BLD AUTO: 58 %
NITRATE UR QL: NEGATIVE
NRBC # BLD AUTO: 0 10E3/UL
NRBC BLD AUTO-RTO: 0 /100
O2/TOTAL GAS SETTING VFR VENT: 0 %
OXYHGB MFR BLDV: 58 % (ref 70–75)
P AXIS - MUSE: 15 DEGREES
P AXIS - MUSE: NORMAL DEGREES
PCO2 BLDV: 42 MM HG (ref 40–50)
PH BLDV: 7.39 [PH] (ref 7.32–7.43)
PH UR STRIP: 5.5 [PH] (ref 5–7)
PLATELET # BLD AUTO: 222 10E3/UL (ref 150–450)
PO2 BLDV: 31 MM HG (ref 25–47)
POTASSIUM SERPL-SCNC: 4.1 MMOL/L (ref 3.4–5.3)
PR INTERVAL - MUSE: 216 MS
PR INTERVAL - MUSE: NORMAL MS
PROT SERPL-MCNC: 6.2 G/DL (ref 6.4–8.3)
QRS DURATION - MUSE: 80 MS
QRS DURATION - MUSE: 84 MS
QT - MUSE: 330 MS
QT - MUSE: 340 MS
QTC - MUSE: 430 MS
QTC - MUSE: 445 MS
R AXIS - MUSE: -29 DEGREES
R AXIS - MUSE: -36 DEGREES
RBC # BLD AUTO: 4.91 10E6/UL (ref 3.8–5.2)
RBC URINE: 1 /HPF
SAO2 % BLDV: 59.3 % (ref 70–75)
SODIUM SERPL-SCNC: 133 MMOL/L (ref 135–145)
SP GR UR STRIP: 1.01 (ref 1–1.03)
SQUAMOUS EPITHELIAL: <1 /HPF
SYSTOLIC BLOOD PRESSURE - MUSE: NORMAL MMHG
SYSTOLIC BLOOD PRESSURE - MUSE: NORMAL MMHG
T AXIS - MUSE: -10 DEGREES
T AXIS - MUSE: -10 DEGREES
UROBILINOGEN UR STRIP-MCNC: NORMAL MG/DL
VENTRICULAR RATE- MUSE: 102 BPM
VENTRICULAR RATE- MUSE: 103 BPM
WBC # BLD AUTO: 6.8 10E3/UL (ref 4–11)
WBC URINE: 3 /HPF

## 2025-07-06 PROCEDURE — 81001 URINALYSIS AUTO W/SCOPE: CPT | Performed by: SOCIAL WORKER

## 2025-07-06 PROCEDURE — 70450 CT HEAD/BRAIN W/O DYE: CPT

## 2025-07-06 PROCEDURE — 82247 BILIRUBIN TOTAL: CPT | Performed by: SOCIAL WORKER

## 2025-07-06 PROCEDURE — 36415 COLL VENOUS BLD VENIPUNCTURE: CPT | Performed by: SOCIAL WORKER

## 2025-07-06 PROCEDURE — 85025 COMPLETE CBC W/AUTO DIFF WBC: CPT | Performed by: SOCIAL WORKER

## 2025-07-06 PROCEDURE — 96360 HYDRATION IV INFUSION INIT: CPT

## 2025-07-06 PROCEDURE — 93005 ELECTROCARDIOGRAM TRACING: CPT

## 2025-07-06 PROCEDURE — 85025 COMPLETE CBC W/AUTO DIFF WBC: CPT | Performed by: EMERGENCY MEDICINE

## 2025-07-06 PROCEDURE — 82962 GLUCOSE BLOOD TEST: CPT

## 2025-07-06 PROCEDURE — 36415 COLL VENOUS BLD VENIPUNCTURE: CPT | Performed by: EMERGENCY MEDICINE

## 2025-07-06 PROCEDURE — 258N000003 HC RX IP 258 OP 636: Performed by: SOCIAL WORKER

## 2025-07-06 PROCEDURE — 99285 EMERGENCY DEPT VISIT HI MDM: CPT | Mod: 25

## 2025-07-06 PROCEDURE — 82805 BLOOD GASES W/O2 SATURATION: CPT | Performed by: SOCIAL WORKER

## 2025-07-06 PROCEDURE — 82010 KETONE BODYS QUAN: CPT | Performed by: SOCIAL WORKER

## 2025-07-06 RX ADMIN — SODIUM CHLORIDE 1000 ML: 0.9 INJECTION, SOLUTION INTRAVENOUS at 14:04

## 2025-07-06 ASSESSMENT — COLUMBIA-SUICIDE SEVERITY RATING SCALE - C-SSRS
6. HAVE YOU EVER DONE ANYTHING, STARTED TO DO ANYTHING, OR PREPARED TO DO ANYTHING TO END YOUR LIFE?: NO
1. IN THE PAST MONTH, HAVE YOU WISHED YOU WERE DEAD OR WISHED YOU COULD GO TO SLEEP AND NOT WAKE UP?: NO
2. HAVE YOU ACTUALLY HAD ANY THOUGHTS OF KILLING YOURSELF IN THE PAST MONTH?: NO

## 2025-07-06 ASSESSMENT — ACTIVITIES OF DAILY LIVING (ADL)
ADLS_ACUITY_SCORE: 41

## 2025-07-06 NOTE — ED PROVIDER NOTES
"  Emergency Department Note      History of Present Illness     Chief Complaint   Hyperglycemia      HPI   Kaitlyn Yao is a 79 year old female with a history of T2M diabetes, hypertension, hyperlipidemia who presents to the emergency room with chief complaint of evaded blood sugar as well as cognitive changes.  Patient's daughter, who accompanies her, reports that today her PCP called and said to come into the emergency department.  She reports that on Friday, she saw her PCP for the first time since 9 September 2024.  PCP was quite surprised at the changes in her memory.  Today, her results came back and showed that her hemoglobin A1c was 13.7 and her blood sugar was 460 therefore she called family and told them to bring her to the ER.  Daughter reports that patient's memory issues likely started in November of last year, around [place] she noticed that her mother who usually hosts [place] dinner was not ready at all for having people around.  She states that her mother stopped taking all of her medications several months ago and did not know why.  Patient lives with another daughter, who is a nurse.  Patient herself notes that she is having more memory issues and has been concerned by this.  She denies any chest pain, shortness of breath, numbness weakness anywhere slurred speech, difficulty speaking or swallowing or vision changes.  No recent fevers illnesses, she has no dysuria frequency.  No abdominal pain, nausea or vomiting.  Daughter also reports that patient had some unwitnessed falls 2 months ago.    Independent Historian   Daughter as detailed above.    Review of External Notes   I reviewed the office visit from 7/30/2025:   \"Patient is in today, accompanied by her 2 daughters-she has history of hypertension, hyperlipidemia and adult onset diabetes. She denies any complaints of exertional chest discomfort or shortness of breath. It appears that she went off all of her medications-at least 9 to 12 " "months ago and she has self attributes this to the fact that \"she has dementia\". She is being followed by Dr. Finn from endocrinology and last A1c was 7.8 in April 2024. She has been on Jardiance and glimepiride in the past and also stopped these medications. She was started on Ozempic and patient's daughter moved in to live with her approximately 18 months ago and later realized that she was not doing the Ozempic injections either-she however is now being supervised by her children and has been on Ozempic for the past 7 to 8 weeks. She has been complaining of frequent nausea and has been using Tums-also takes senna for constipation.\"    Past Medical History     Medical History and Problem List   Past Medical History:   Diagnosis Date    Deafness in left ear     Diabetes mellitus (H)     Hearing loss     Hyperlipemia     Hyperlipidemia     Hypertension     Injury of shoulder     Meniere's disease     Noninfectious ileitis     Pyelonephritis     Renal disease     Rosacea     Rosacea     Sciatica of right side     Urge incontinence     Urinary, incontinence, stress female        Medications   BIOTIN PO  Coenzyme Q10 (COQ-10 PO)  exenatide (BYDUREON) 2 MG SUSR  glimepiride (AMARYL) 1 MG tablet  HYDROcodone-acetaminophen 5-325 MG PO tablet  irbesartan (AVAPRO) 300 MG tablet  Lactobacillus (PROBIOTIC ACIDOPHILUS PO)  Pravastatin Sodium (PRAVACHOL PO)  sertraline 50 MG PO tablet        Surgical History   Past Surgical History:   Procedure Laterality Date    BIOPSY BREAST Right 2/19/2020    Procedure: RIGHT BREAST RETROAREOLAR DUCTAL EXCISION;  Surgeon: Jie Hubbard MD;  Location: [facility] OR    BIOPSY BREAST SEED LOCALIZATION Left 9/16/2019    Procedure: SEED LOCALIZED LEFT BREAST EXCISIONAL BIOPSY;  Surgeon: Jie Hubbard MD;  Location: [facility] OR    BREAST SURGERY      CHOLECYSTECTOMY      COMBINED CYSTOSCOPY, INSERT STENT URETER(S) Left 11/30/2017    Procedure: COMBINED CYSTOSCOPY, INSERT STENT URETER(S);  CYSTOSCOPY, " LEFT URETERAL STENT PLACEMENT;  Surgeon: Rosalino Mejia MD;  Location: [facility] OR    CYSTOSCOPY  1/11/2013    Procedure: CYSTOSCOPY;;  Surgeon: Matthew Ramírez MD;  Location: [facility] OR    CYSTOSCOPY BLADDER WITH STONE EXTRACTION  12/22/2017    Procedure: CYSTOSCOPY BLADDER WITH STONE EXTRACTION;;  Surgeon: Rosalino Mejia MD;  Location: [facility]    CYSTOSCOPY, URETEROSCOPY, INSERT STENT Left 12/22/2017    Procedure: CYSTOSCOPY, URETEROSCOPY, INSERT STENT;  CYSTOSCOPY, LEFT URETEROSCOPY, LEFT URETERAL STENT REMOVAL, AND STONE EXTRACTION;  Surgeon: Rosalino Mejia MD;  Location:  OR    DAVINCI HYSTERECTOMY SUPRACERVICAL, SACROCOLPOPEXY, COMBINED  1/11/2013    Procedure: COMBINED DAVINCI HYSTERECTOMY SUPRACERVICAL, SACROCOLPOPEXY;  DAVINCI SUPRACERVICAL HYSTERECTOMY, BILATERAL SALPINGO OOPHORECTOMY,SACROCOLPOPEXY, PERINEORRHAPHY, BLADDER SLING (EXACT),  AND CYSTOSCOPY ;  Surgeon: Matthew Ramírez MD;  Location:  OR    ENHANCE ENDOLYMPHATIC SAC, DEC SIGMOID SINUS, EXTND FACIAL RECESS APPRCH, MASTOIDECTOMY, BMT, COMBO Left 11/12/2015    Procedure: COMBINED ENHANCE ENDOLYMPHATIC SAC, DECOMPRESS SIGMOID SINUS, EXTENDED FACIAL RECESS APPROACH, COMPLETE MASTOIDECTOMY, MYRINGOTOMY, INSERT TUBE;  Surgeon: Dilan Azevedo MD;  Location: [facility] OR    ENT SURGERY      T&A    GENITOURINARY SURGERY      open pyelolithotomy for kidney stone removal......stenting at right kidney    ORTHOPEDIC SURGERY      Achilles tendon repair    PERINEORRHAPHY  1/11/2013    Procedure: PERINEORRHAPHY;;  Surgeon: Matthew Ramírez MD;  Location:  OR    SLING TRANSVAGINAL  1/11/2013    Procedure: SLING TRANSVAGINAL;;  Surgeon: Matthew Ramírez MD;  Location:  OR       Physical Exam     Patient Vitals for the past 24 hrs:   BP Temp Temp src Pulse Resp SpO2   07/06/25 1500 (!) 163/97 -- -- 93 16 94 %   07/06/25 1445 -- -- -- 101 15 95 %   07/06/25 1430 (!) 153/88 -- -- 90 19 --   07/06/25 1415 (!) 150/96 -- -- 105 15 --    07/06/25 1330 (!) 166/108 97.6  F (36.4  C) Temporal 112 16 95 %     Physical Exam  General: Overall stable and nontoxic appearing  HEENT: Conjunctivae clear, no scleral icterus, mucous membranes moist  Neuro: Oriented x 4 although initially she notes that the president is [place] then quickly corrects to Trump.  Able to spell world backwards.  Alert, conversant; no facial droop, no slurred speech; strength and sensation grossly intact and equal bilaterally in upper and lower extremities; gait is intact without ataxia   CV: Regular rate, regular rhythm, radial and DP pulses equal  Respiratory: No signs of respiratory distress, lungs clear to auscultation bilaterally   Abdomen: Soft, without rigidity or rebound throughout  MSK: No lower extremity swelling or tenderness     Diagnostics     Lab Results   Labs Ordered and Resulted from Time of ED Arrival to Time of ED Departure   COMPREHENSIVE METABOLIC PANEL - Abnormal       Result Value    Sodium 133 (*)     Potassium 4.1      Carbon Dioxide (CO2) 22      Anion Gap 14      Urea Nitrogen 15.5      Creatinine 0.54      GFR Estimate >90      Calcium 9.3      Chloride 97 (*)     Glucose 532 (*)     Alkaline Phosphatase 77      AST 19      ALT 28      Protein Total 6.2 (*)     Albumin 3.8      Bilirubin Total 0.5     GLUCOSE BY METER - Abnormal    GLUCOSE BY METER POCT 460 (*)    BLOOD GAS VENOUS - Abnormal    pH Venous 7.39      pCO2 Venous 42      pO2 Venous 31      Bicarbonate Venous 25      Base Excess/Deficit Venous -0.3      FIO2 0      Oxyhemoglobin Venous 58 (*)     O2 Sat, Venous 59.3 (*)    ROUTINE UA WITH MICROSCOPIC REFLEX TO CULTURE - Abnormal    Color Urine Light Yellow      Appearance Urine Clear      Glucose Urine >=1000 (*)     Bilirubin Urine Negative      Ketones Urine Negative      Specific Gravity Urine 1.010      Blood Urine Negative      pH Urine 5.5      Protein Albumin Urine Negative      Urobilinogen Urine Normal      Nitrite Urine Negative       Leukocyte Esterase Urine Negative      RBC Urine 1      WBC Urine 3      Squamous Epithelials Urine <1     KETONE BETA-HYDROXYBUTYRATE QUANTITATIVE, RAPID - Normal    Ketone (Beta-Hydroxybutyrate) Quantitative <0.18     CBC WITH PLATELETS AND DIFFERENTIAL    WBC Count 6.8      RBC Count 4.91      Hemoglobin 15.3      Hematocrit 45.0      MCV 92      MCH 31.2      MCHC 34.0      RDW 13.3      Platelet Count 222      % Neutrophils 58      % Lymphocytes 35      % Monocytes 6      % Eosinophils 1      % Basophils 0      % Immature Granulocytes 0      NRBCs per 100 WBC 0      Absolute Neutrophils 4.0      Absolute Lymphocytes 2.4      Absolute Monocytes 0.4      Absolute Eosinophils 0.1      Absolute Basophils 0.0      Absolute Immature Granulocytes 0.0      Absolute NRBCs 0.0         Imaging   Head CT w/o contrast   Final Result   IMPRESSION:       1. Senescent changes and sequelae of chronic microangiopathy without acute intracranial abnormality.          EKG   EKG 1355   Sinus rhythm with a first-degree AV block  Rate 102 WA appears to be 200 QRS 80 Qtc 430    EKG 1714  Borderline sinus tachycardia with 1st degree AV   Rate 103  QRS 84 Qtc 445     Independent Interpretation   CT head on my independent interpretation no evidence of bleed or midline shift     ED Course      Medications Administered   Medications   sodium chloride 0.9% BOLUS 1,000 mL (0 mLs Intravenous Stopped 7/6/25 1529)       Procedures   Procedures     Discussion of Management   None    ED Course        Additional Documentation  None    Medical Decision Making / Diagnosis     CMS Diagnoses: None    MIPS   None           [facility]   Kaitlyn Yao is a 79 year old female with a history of hypertension, hyperlipidemia and diabetes who presents to the emergency department with a chief complaint of memory changes and elevated blood sugar.  On examination, she is stable nontoxic denies any numbness or weakness and ambulating steadily with strength and  sensation is intact and equal bilaterally.  She has no chest pain or shortness of breath or any other anginal equivalents, her EKG is nonischemic I doubt any ischemic processes with her presentation of memory complaints and hyperglycemia.  EKG was initially read as accelerated unctional rhythm, however I do think this actually sinus and I see P waves.  Her labs are notable for elevated blood sugar however there is absolute no gap, no decrease in bicarb VBG is normal and no ketones, there is no evidence of DKA.  With report of her changes in memory over the past 8 months, I do not think that this represents HHS.  Certainly there is no evidence of coma.  CT head obtained which did not show any evidence of subdural hematoma.  UA without any evidence of infection.  I spoke on the phone with patient's daughter Geovanna with whom she lives who is also an RN here at [facility], she will restart her mother's medications and she does have blood glucose tests at home as well.They will facilitate a follow-up appointment next week certainly for at least a lab check.   Strict return precautions discussed, they verbalized understanding and are comfortable at this time with the plan for discharge.    Disposition   The patient was discharged.     Diagnosis     ICD-10-CM    1. Hyperglycemia  R73.9       2. Memory impairment  R41.3 Adult Neurology  Referral           Discharge Medications   New Prescriptions    No medications on file         MD Milad Rose Connie, MD  07/06/25 6914

## 2025-07-06 NOTE — ED TRIAGE NOTES
Pt coming in with an elevated A1c of 13.7. Pt has hx of diabetes and has been off of all medications for the last 6 months, due to some memory issues. Placed back on munjaro 6 weeks ago. PCP sent her in here.  in triage. Pt is poor historian but daughter is with her to provide factual, supplemental information.      Triage Assessment (Adult)       Row Name 07/06/25 3259          Triage Assessment    Airway WDL WDL        Respiratory WDL    Respiratory WDL WDL        Skin Circulation/Temperature WDL    Skin Circulation/Temperature WDL WDL        Cardiac WDL    Cardiac WDL --  tachycardia        Peripheral/Neurovascular WDL    Peripheral Neurovascular WDL WDL        Cognitive/Neuro/Behavioral WDL    Cognitive/Neuro/Behavioral WDL WDL

## 2025-07-06 NOTE — DISCHARGE INSTRUCTIONS
You were seen in the emergency department for memory changes and high blood sugar.  Your exam workup here was overall reassuring, we do not see signs of an acute emergency at this time.  We do not think that the blood sugar is leading to the memory changes.  I have also placed a neurology follow-up.    Your CT scan here did not show any acute findings.  You will need to have continued workup outpatient    Please restart the medications for your diabetes.    Come back to the emergency room if you start to have any chest pain, difficulty breathing, vomiting cannot keep anything down, belly pain, fainting, severe confusion or sleepiness, or other concerning symptoms.

## 2025-08-24 ENCOUNTER — APPOINTMENT (OUTPATIENT)
Dept: CT IMAGING | Facility: CLINIC | Age: 80
End: 2025-08-24
Attending: EMERGENCY MEDICINE
Payer: MEDICARE

## 2025-08-24 ENCOUNTER — HOSPITAL ENCOUNTER (INPATIENT)
Facility: CLINIC | Age: 80
LOS: 3 days | Discharge: NURSING, REHAB OR LONG-TERM CARE | End: 2025-08-27
Attending: EMERGENCY MEDICINE
Payer: MEDICARE

## 2025-08-24 PROBLEM — K62.89 PROCTITIS: Status: ACTIVE | Noted: 2025-08-24

## 2025-08-24 PROCEDURE — 74177 CT ABD & PELVIS W/CONTRAST: CPT

## 2025-08-24 ASSESSMENT — ACTIVITIES OF DAILY LIVING (ADL)
ADLS_ACUITY_SCORE: 41

## 2025-08-25 ASSESSMENT — ACTIVITIES OF DAILY LIVING (ADL)
ADLS_ACUITY_SCORE: 28
ADLS_ACUITY_SCORE: 28
ADLS_ACUITY_SCORE: 41
ADLS_ACUITY_SCORE: 41
ADLS_ACUITY_SCORE: 25
ADLS_ACUITY_SCORE: 29
ADLS_ACUITY_SCORE: 41
ADLS_ACUITY_SCORE: 29
ADLS_ACUITY_SCORE: 41
ADLS_ACUITY_SCORE: 29
ADLS_ACUITY_SCORE: 41
ADLS_ACUITY_SCORE: 32
ADLS_ACUITY_SCORE: 41

## 2025-08-26 ASSESSMENT — ACTIVITIES OF DAILY LIVING (ADL)
ADLS_ACUITY_SCORE: 46
ADLS_ACUITY_SCORE: 32
ADLS_ACUITY_SCORE: 42
ADLS_ACUITY_SCORE: 32
ADLS_ACUITY_SCORE: 32
ADLS_ACUITY_SCORE: 46
ADLS_ACUITY_SCORE: 32
ADLS_ACUITY_SCORE: 46
ADLS_ACUITY_SCORE: 32
ADLS_ACUITY_SCORE: 42
ADLS_ACUITY_SCORE: 32
ADLS_ACUITY_SCORE: 46
ADLS_ACUITY_SCORE: 46
ADLS_ACUITY_SCORE: 32
ADLS_ACUITY_SCORE: 46

## 2025-08-27 ENCOUNTER — TELEPHONE (OUTPATIENT)
Dept: SURGERY | Facility: CLINIC | Age: 80
End: 2025-08-27
Payer: OTHER GOVERNMENT

## 2025-08-27 ASSESSMENT — ACTIVITIES OF DAILY LIVING (ADL)
ADLS_ACUITY_SCORE: 48
ADLS_ACUITY_SCORE: 50
ADLS_ACUITY_SCORE: 50
ADLS_ACUITY_SCORE: 55
ADLS_ACUITY_SCORE: 48
ADLS_ACUITY_SCORE: 54
ADLS_ACUITY_SCORE: 48
ADLS_ACUITY_SCORE: 48
ADLS_ACUITY_SCORE: 55
ADLS_ACUITY_SCORE: 50
ADLS_ACUITY_SCORE: 55
ADLS_ACUITY_SCORE: 50
ADLS_ACUITY_SCORE: 48
ADLS_ACUITY_SCORE: 50
ADLS_ACUITY_SCORE: 48
ADLS_ACUITY_SCORE: 50
ADLS_ACUITY_SCORE: 46
ADLS_ACUITY_SCORE: 48

## (undated) DEVICE — LINEN TOWEL PACK X5 5464

## (undated) DEVICE — ESU GROUND PAD UNIVERSAL W/O CORD

## (undated) DEVICE — BAG CYSTO TABLE DRAIN

## (undated) DEVICE — PAD CHUX UNDERPAD 23X24" 7136

## (undated) DEVICE — SU VICRYL 3-0 SH 27" J316H

## (undated) DEVICE — DECANTER VIAL 2006S

## (undated) DEVICE — NDL 25GA 1.5" 305127

## (undated) DEVICE — CATH URETERAL OPEN END 6FR AXXCESS

## (undated) DEVICE — SOL WATER IRRIG 1000ML BOTTLE 2F7114

## (undated) DEVICE — KIT SURGICAL TURNOVER FVSD-01D

## (undated) DEVICE — PACK CYSTOSCOPY SBA15CYFSI

## (undated) DEVICE — DRAPE BREAST/CHEST 29420

## (undated) DEVICE — PACK MINOR SBA15MIFSE

## (undated) DEVICE — CLIP ETHICON LIGACLIP SM BLUE LT100

## (undated) DEVICE — ESU ELEC BLADE 2.75" COATED/INSULATED E1455

## (undated) DEVICE — SOL WATER IRRIG 3000ML BAG 2B7117

## (undated) DEVICE — DRSG STERI STRIP 1/4X3" R1541

## (undated) DEVICE — GUIDEWIRE URO SOLO FLEX STR 0.035"X150CM HW35FS

## (undated) DEVICE — SLEEVE PROTECTIVE BREAST BIOPSY  GMSLV001-10

## (undated) DEVICE — ESU PENCIL W/SMOKE EVAC CVPLP2000

## (undated) DEVICE — WIRE GUIDE NITINOL 1.2MX34CM

## (undated) DEVICE — SUCTION CANISTER MEDIVAC LINER 3000ML W/LID 65651-530

## (undated) DEVICE — GLOVE PROTEXIS MICRO 6.0  2D73PM60

## (undated) DEVICE — BASKET RETRIEVAL 1.9FRX120CM ESCAPE NTNL 4 WIRE 390-201

## (undated) DEVICE — SYR BULB IRRIG 50ML LATEX FREE 0035280

## (undated) DEVICE — GLOVE PROTEXIS W/NEU-THERA 7.5  2D73TE75

## (undated) DEVICE — SU MONOCRYL 4-0 PS-2 18" UND Y496G

## (undated) DEVICE — GLOVE PROTEXIS BLUE W/NEU-THERA 6.5  2D73EB65

## (undated) DEVICE — SYR 10ML FINGER CONTROL W/O NDL 309695

## (undated) DEVICE — DRSG GAUZE 4X4" 3033

## (undated) DEVICE — ESU PENCIL W/SMOKE EVAC NEPTUNE STRYKER 0703-046-000

## (undated) DEVICE — ADH SKIN CLOSURE PREMIERPRO EXOFIN 1.0ML 3470

## (undated) DEVICE — RAD RX ISOVUE 300 (50ML) 61% IOPAMIDOL CHARGE PER ML

## (undated) DEVICE — PREP CHLORAPREP 26ML TINTED ORANGE  260815

## (undated) DEVICE — NDL 19GA 1.5"

## (undated) DEVICE — Device

## (undated) RX ORDER — PROPOFOL 10 MG/ML
INJECTION, EMULSION INTRAVENOUS
Status: DISPENSED
Start: 2019-09-16

## (undated) RX ORDER — CEFAZOLIN SODIUM 2 G/100ML
INJECTION, SOLUTION INTRAVENOUS
Status: DISPENSED
Start: 2019-09-16

## (undated) RX ORDER — LIDOCAINE HYDROCHLORIDE 10 MG/ML
INJECTION, SOLUTION EPIDURAL; INFILTRATION; INTRACAUDAL; PERINEURAL
Status: DISPENSED
Start: 2020-02-19

## (undated) RX ORDER — FENTANYL CITRATE 50 UG/ML
INJECTION, SOLUTION INTRAMUSCULAR; INTRAVENOUS
Status: DISPENSED
Start: 2019-09-16

## (undated) RX ORDER — CEFAZOLIN SODIUM 2 G/100ML
INJECTION, SOLUTION INTRAVENOUS
Status: DISPENSED
Start: 2017-11-30

## (undated) RX ORDER — FENTANYL CITRATE 50 UG/ML
INJECTION, SOLUTION INTRAMUSCULAR; INTRAVENOUS
Status: DISPENSED
Start: 2020-02-19

## (undated) RX ORDER — HYDROCODONE BITARTRATE AND ACETAMINOPHEN 5; 325 MG/1; MG/1
TABLET ORAL
Status: DISPENSED
Start: 2019-09-16

## (undated) RX ORDER — PROPOFOL 10 MG/ML
INJECTION, EMULSION INTRAVENOUS
Status: DISPENSED
Start: 2017-12-22

## (undated) RX ORDER — CIPROFLOXACIN 2 MG/ML
INJECTION, SOLUTION INTRAVENOUS
Status: DISPENSED
Start: 2017-12-22

## (undated) RX ORDER — FENTANYL CITRATE 50 UG/ML
INJECTION, SOLUTION INTRAMUSCULAR; INTRAVENOUS
Status: DISPENSED
Start: 2017-12-22

## (undated) RX ORDER — PROPOFOL 10 MG/ML
INJECTION, EMULSION INTRAVENOUS
Status: DISPENSED
Start: 2020-02-19

## (undated) RX ORDER — PROPOFOL 10 MG/ML
INJECTION, EMULSION INTRAVENOUS
Status: DISPENSED
Start: 2017-11-30

## (undated) RX ORDER — LIDOCAINE HYDROCHLORIDE 10 MG/ML
INJECTION, SOLUTION EPIDURAL; INFILTRATION; INTRACAUDAL; PERINEURAL
Status: DISPENSED
Start: 2019-09-16

## (undated) RX ORDER — BUPIVACAINE HYDROCHLORIDE 2.5 MG/ML
INJECTION, SOLUTION EPIDURAL; INFILTRATION; INTRACAUDAL
Status: DISPENSED
Start: 2020-02-19

## (undated) RX ORDER — CEFAZOLIN SODIUM 2 G/100ML
INJECTION, SOLUTION INTRAVENOUS
Status: DISPENSED
Start: 2020-02-19

## (undated) RX ORDER — BUPIVACAINE HYDROCHLORIDE 2.5 MG/ML
INJECTION, SOLUTION EPIDURAL; INFILTRATION; INTRACAUDAL
Status: DISPENSED
Start: 2019-09-16

## (undated) RX ORDER — LIDOCAINE HYDROCHLORIDE 20 MG/ML
INJECTION, SOLUTION EPIDURAL; INFILTRATION; INTRACAUDAL; PERINEURAL
Status: DISPENSED
Start: 2019-09-16

## (undated) RX ORDER — FENTANYL CITRATE 50 UG/ML
INJECTION, SOLUTION INTRAMUSCULAR; INTRAVENOUS
Status: DISPENSED
Start: 2017-11-30